# Patient Record
Sex: FEMALE | Race: WHITE | NOT HISPANIC OR LATINO
[De-identification: names, ages, dates, MRNs, and addresses within clinical notes are randomized per-mention and may not be internally consistent; named-entity substitution may affect disease eponyms.]

---

## 2020-12-05 ENCOUNTER — TRANSCRIPTION ENCOUNTER (OUTPATIENT)
Age: 55
End: 2020-12-05

## 2021-07-30 ENCOUNTER — TRANSCRIPTION ENCOUNTER (OUTPATIENT)
Age: 56
End: 2021-07-30

## 2021-08-22 ENCOUNTER — EMERGENCY (EMERGENCY)
Facility: HOSPITAL | Age: 56
LOS: 0 days | Discharge: HOME | End: 2021-08-23
Attending: EMERGENCY MEDICINE | Admitting: EMERGENCY MEDICINE
Payer: COMMERCIAL

## 2021-08-22 VITALS
RESPIRATION RATE: 18 BRPM | TEMPERATURE: 97 F | SYSTOLIC BLOOD PRESSURE: 203 MMHG | HEART RATE: 63 BPM | DIASTOLIC BLOOD PRESSURE: 95 MMHG | WEIGHT: 179.9 LBS | OXYGEN SATURATION: 96 %

## 2021-08-22 DIAGNOSIS — K80.20 CALCULUS OF GALLBLADDER WITHOUT CHOLECYSTITIS WITHOUT OBSTRUCTION: ICD-10-CM

## 2021-08-22 DIAGNOSIS — F17.200 NICOTINE DEPENDENCE, UNSPECIFIED, UNCOMPLICATED: ICD-10-CM

## 2021-08-22 DIAGNOSIS — Z98.84 BARIATRIC SURGERY STATUS: ICD-10-CM

## 2021-08-22 DIAGNOSIS — N39.0 URINARY TRACT INFECTION, SITE NOT SPECIFIED: ICD-10-CM

## 2021-08-22 DIAGNOSIS — Z79.899 OTHER LONG TERM (CURRENT) DRUG THERAPY: ICD-10-CM

## 2021-08-22 DIAGNOSIS — R10.31 RIGHT LOWER QUADRANT PAIN: ICD-10-CM

## 2021-08-22 LAB
ALBUMIN SERPL ELPH-MCNC: 4.2 G/DL — SIGNIFICANT CHANGE UP (ref 3.5–5.2)
ALP SERPL-CCNC: 76 U/L — SIGNIFICANT CHANGE UP (ref 30–115)
ALT FLD-CCNC: 19 U/L — SIGNIFICANT CHANGE UP (ref 0–41)
ANION GAP SERPL CALC-SCNC: 12 MMOL/L — SIGNIFICANT CHANGE UP (ref 7–14)
AST SERPL-CCNC: 24 U/L — SIGNIFICANT CHANGE UP (ref 0–41)
BASOPHILS # BLD AUTO: 0.11 K/UL — SIGNIFICANT CHANGE UP (ref 0–0.2)
BASOPHILS NFR BLD AUTO: 1 % — SIGNIFICANT CHANGE UP (ref 0–1)
BILIRUB SERPL-MCNC: <0.2 MG/DL — SIGNIFICANT CHANGE UP (ref 0.2–1.2)
BUN SERPL-MCNC: 22 MG/DL — HIGH (ref 10–20)
CALCIUM SERPL-MCNC: 9.5 MG/DL — SIGNIFICANT CHANGE UP (ref 8.5–10.1)
CHLORIDE SERPL-SCNC: 111 MMOL/L — HIGH (ref 98–110)
CO2 SERPL-SCNC: 21 MMOL/L — SIGNIFICANT CHANGE UP (ref 17–32)
CREAT SERPL-MCNC: 0.8 MG/DL — SIGNIFICANT CHANGE UP (ref 0.7–1.5)
EOSINOPHIL # BLD AUTO: 0.71 K/UL — HIGH (ref 0–0.7)
EOSINOPHIL NFR BLD AUTO: 6.3 % — SIGNIFICANT CHANGE UP (ref 0–8)
GLUCOSE SERPL-MCNC: 111 MG/DL — HIGH (ref 70–99)
HCT VFR BLD CALC: 45.1 % — SIGNIFICANT CHANGE UP (ref 37–47)
HGB BLD-MCNC: 15 G/DL — SIGNIFICANT CHANGE UP (ref 12–16)
IMM GRANULOCYTES NFR BLD AUTO: 0.6 % — HIGH (ref 0.1–0.3)
LIDOCAIN IGE QN: 52 U/L — SIGNIFICANT CHANGE UP (ref 7–60)
LYMPHOCYTES # BLD AUTO: 3.62 K/UL — HIGH (ref 1.2–3.4)
LYMPHOCYTES # BLD AUTO: 32 % — SIGNIFICANT CHANGE UP (ref 20.5–51.1)
MAGNESIUM SERPL-MCNC: 2 MG/DL — SIGNIFICANT CHANGE UP (ref 1.8–2.4)
MCHC RBC-ENTMCNC: 29.9 PG — SIGNIFICANT CHANGE UP (ref 27–31)
MCHC RBC-ENTMCNC: 33.3 G/DL — SIGNIFICANT CHANGE UP (ref 32–37)
MCV RBC AUTO: 89.8 FL — SIGNIFICANT CHANGE UP (ref 81–99)
MONOCYTES # BLD AUTO: 0.74 K/UL — HIGH (ref 0.1–0.6)
MONOCYTES NFR BLD AUTO: 6.5 % — SIGNIFICANT CHANGE UP (ref 1.7–9.3)
NEUTROPHILS # BLD AUTO: 6.07 K/UL — SIGNIFICANT CHANGE UP (ref 1.4–6.5)
NEUTROPHILS NFR BLD AUTO: 53.6 % — SIGNIFICANT CHANGE UP (ref 42.2–75.2)
NRBC # BLD: 0 /100 WBCS — SIGNIFICANT CHANGE UP (ref 0–0)
PLATELET # BLD AUTO: 379 K/UL — SIGNIFICANT CHANGE UP (ref 130–400)
POTASSIUM SERPL-MCNC: 4.3 MMOL/L — SIGNIFICANT CHANGE UP (ref 3.5–5)
POTASSIUM SERPL-SCNC: 4.3 MMOL/L — SIGNIFICANT CHANGE UP (ref 3.5–5)
PROT SERPL-MCNC: 6.8 G/DL — SIGNIFICANT CHANGE UP (ref 6–8)
RBC # BLD: 5.02 M/UL — SIGNIFICANT CHANGE UP (ref 4.2–5.4)
RBC # FLD: 13.9 % — SIGNIFICANT CHANGE UP (ref 11.5–14.5)
SODIUM SERPL-SCNC: 144 MMOL/L — SIGNIFICANT CHANGE UP (ref 135–146)
WBC # BLD: 11.32 K/UL — HIGH (ref 4.8–10.8)
WBC # FLD AUTO: 11.32 K/UL — HIGH (ref 4.8–10.8)

## 2021-08-22 PROCEDURE — 74177 CT ABD & PELVIS W/CONTRAST: CPT | Mod: 26,MA

## 2021-08-22 PROCEDURE — 99285 EMERGENCY DEPT VISIT HI MDM: CPT

## 2021-08-22 RX ORDER — MORPHINE SULFATE 50 MG/1
2 CAPSULE, EXTENDED RELEASE ORAL ONCE
Refills: 0 | Status: DISCONTINUED | OUTPATIENT
Start: 2021-08-22 | End: 2021-08-22

## 2021-08-22 RX ORDER — IOHEXOL 300 MG/ML
30 INJECTION, SOLUTION INTRAVENOUS ONCE
Refills: 0 | Status: COMPLETED | OUTPATIENT
Start: 2021-08-22 | End: 2021-08-22

## 2021-08-22 RX ORDER — SODIUM CHLORIDE 9 MG/ML
1000 INJECTION INTRAMUSCULAR; INTRAVENOUS; SUBCUTANEOUS ONCE
Refills: 0 | Status: COMPLETED | OUTPATIENT
Start: 2021-08-22 | End: 2021-08-22

## 2021-08-22 RX ORDER — MORPHINE SULFATE 50 MG/1
4 CAPSULE, EXTENDED RELEASE ORAL ONCE
Refills: 0 | Status: DISCONTINUED | OUTPATIENT
Start: 2021-08-22 | End: 2021-08-22

## 2021-08-22 RX ADMIN — IOHEXOL 30 MILLILITER(S): 300 INJECTION, SOLUTION INTRAVENOUS at 20:51

## 2021-08-22 RX ADMIN — MORPHINE SULFATE 4 MILLIGRAM(S): 50 CAPSULE, EXTENDED RELEASE ORAL at 20:51

## 2021-08-22 RX ADMIN — SODIUM CHLORIDE 1000 MILLILITER(S): 9 INJECTION INTRAMUSCULAR; INTRAVENOUS; SUBCUTANEOUS at 20:51

## 2021-08-22 RX ADMIN — MORPHINE SULFATE 2 MILLIGRAM(S): 50 CAPSULE, EXTENDED RELEASE ORAL at 23:41

## 2021-08-22 NOTE — ED ADULT NURSE NOTE - NSIMPLEMENTINTERV_GEN_ALL_ED
Implemented All Universal Safety Interventions:  [place] to call system. Call bell, personal items and telephone within reach. Instruct patient to call for assistance. Room bathroom lighting operational. Non-slip footwear when patient is off stretcher. Physically safe environment: no spills, clutter or unnecessary equipment. Stretcher in lowest position, wheels locked, appropriate side rails in place.

## 2021-08-22 NOTE — ED PROVIDER NOTE - ATTENDING CONTRIBUTION TO CARE
Patient is c/o abd pain, RLQ area x one week. Denies f/c/cp/sob.   Vitals reviewed.   Lungs: CTA,   Abd: +BS, +RLQ tenderness, ND, soft,   A/P: Abd pain,   labs, imaging, reevaluation.

## 2021-08-22 NOTE — ED PROVIDER NOTE - PATIENT PORTAL LINK FT
You can access the FollowMyHealth Patient Portal offered by [facility] by registering at the following website: [email or web address]. By joining [facility]’s FollowMyHealth portal, you will also be able to view your health information using other applications (apps) compatible with our system.

## 2021-08-22 NOTE — ED PROVIDER NOTE - OBJECTIVE STATEMENT
56y F pmh Gastric sleeve 2017 presents for eval of abd pain. Pt has intermittent sharp RLQ pain x1wk, no aggravating or relieving factors. Denies fever, ha, cp, sob, weakness, numbness, dysuria, hematuria, n/v/d/c

## 2021-08-22 NOTE — ED PROVIDER NOTE - NSFOLLOWUPINSTRUCTIONS_ED_ALL_ED_FT
Follow up with PMD in 1-2 days.    Urinary Tract Infection    A urinary tract infection (UTI) is an infection of any part of the urinary tract, which includes the kidneys, ureters, bladder, and urethra. Risk factors include ignoring your need to urinate, wiping back to front if female, being an uncircumcised male, and having diabetes or a weak immune system. Symptoms include frequent urination, pain or burning with urination, foul smelling urine, cloudy urine, pain in the lower abdomen, blood in the urine, and fever. If you were prescribed an antibiotic medicine, take it as told by your health care provider. Do not stop taking the antibiotic even if you start to feel better.    SEEK IMMEDIATE MEDICAL CARE IF YOU HAVE ANY OF THE FOLLOWING SYMPTOMS: severe back or abdominal pain, fever, inability to keep fluids or medicine down, dizziness/lightheadedness, or a change in mental status.

## 2021-08-22 NOTE — ED PROVIDER NOTE - NSFOLLOWUPCLINICS_GEN_ALL_ED_FT
[facility]  Surgery  [address] [place] [address]  Phone: [phone]  Fax:

## 2021-08-22 NOTE — ED PROVIDER NOTE - PHYSICAL EXAMINATION
CONST: NAD  EYES: Sclera and conjunctiva clear.   ENT: No nasal discharge. Oropharynx normal appearing, no erythema or exudates. No abscess or swelling. Uvula midline.   NECK: Non-tender, no meningeal signs. normal ROM. supple   CARD: S1 S2; No jvd  RESP: Equal BS B/L, No wheezes, rhonchi or rales. No distress  GI: RLQ tenderness. Soft, non-distended. no cva tenderness. normal BS  MS: Normal ROM in all extremities. pulses 2 +. no calf tenderness or swelling  SKIN: Warm, dry, no acute rashes. Good turgor  NEURO: A&Ox3, No focal deficits. Strength 5/5 with no sensory deficits. Steady gait.

## 2021-08-23 VITALS — DIASTOLIC BLOOD PRESSURE: 72 MMHG | HEART RATE: 68 BPM | SYSTOLIC BLOOD PRESSURE: 140 MMHG

## 2021-08-23 LAB
APPEARANCE UR: CLEAR — SIGNIFICANT CHANGE UP
BACTERIA # UR AUTO: NEGATIVE — SIGNIFICANT CHANGE UP
BILIRUB UR-MCNC: NEGATIVE — SIGNIFICANT CHANGE UP
COLOR SPEC: SIGNIFICANT CHANGE UP
DIFF PNL FLD: ABNORMAL
EPI CELLS # UR: 2 /HPF — SIGNIFICANT CHANGE UP (ref 0–5)
GLUCOSE UR QL: NEGATIVE — SIGNIFICANT CHANGE UP
HYALINE CASTS # UR AUTO: 2 /LPF — SIGNIFICANT CHANGE UP (ref 0–7)
KETONES UR-MCNC: NEGATIVE — SIGNIFICANT CHANGE UP
LEUKOCYTE ESTERASE UR-ACNC: ABNORMAL
NITRITE UR-MCNC: NEGATIVE — SIGNIFICANT CHANGE UP
PH UR: 5.5 — SIGNIFICANT CHANGE UP (ref 5–8)
PROT UR-MCNC: NEGATIVE — SIGNIFICANT CHANGE UP
RBC CASTS # UR COMP ASSIST: 2 /HPF — SIGNIFICANT CHANGE UP (ref 0–4)
SP GR SPEC: 1.02 — SIGNIFICANT CHANGE UP (ref 1.01–1.03)
UROBILINOGEN FLD QL: SIGNIFICANT CHANGE UP
WBC UR QL: 23 /HPF — HIGH (ref 0–5)

## 2021-08-23 RX ORDER — CEFTRIAXONE 500 MG/1
1000 INJECTION, POWDER, FOR SOLUTION INTRAMUSCULAR; INTRAVENOUS ONCE
Refills: 0 | Status: COMPLETED | OUTPATIENT
Start: 2021-08-23 | End: 2021-08-23

## 2021-08-23 RX ORDER — CEFPODOXIME PROXETIL 100 MG
1 TABLET ORAL
Qty: 20 | Refills: 0
Start: 2021-08-23 | End: 2021-09-01

## 2021-08-23 RX ADMIN — CEFTRIAXONE 100 MILLIGRAM(S): 500 INJECTION, POWDER, FOR SOLUTION INTRAMUSCULAR; INTRAVENOUS at 01:20

## 2021-08-24 ENCOUNTER — EMERGENCY (EMERGENCY)
Facility: HOSPITAL | Age: 56
LOS: 0 days | Discharge: HOME | End: 2021-08-24
Attending: EMERGENCY MEDICINE | Admitting: EMERGENCY MEDICINE
Payer: COMMERCIAL

## 2021-08-24 VITALS
SYSTOLIC BLOOD PRESSURE: 224 MMHG | WEIGHT: 179.9 LBS | TEMPERATURE: 98 F | DIASTOLIC BLOOD PRESSURE: 101 MMHG | HEART RATE: 65 BPM | OXYGEN SATURATION: 98 % | RESPIRATION RATE: 20 BRPM

## 2021-08-24 VITALS
DIASTOLIC BLOOD PRESSURE: 91 MMHG | OXYGEN SATURATION: 98 % | RESPIRATION RATE: 18 BRPM | SYSTOLIC BLOOD PRESSURE: 204 MMHG | TEMPERATURE: 97 F | HEART RATE: 68 BPM

## 2021-08-24 DIAGNOSIS — M25.551 PAIN IN RIGHT HIP: ICD-10-CM

## 2021-08-24 LAB
ALBUMIN SERPL ELPH-MCNC: 4.4 G/DL — SIGNIFICANT CHANGE UP (ref 3.5–5.2)
ALP SERPL-CCNC: 66 U/L — SIGNIFICANT CHANGE UP (ref 30–115)
ALT FLD-CCNC: 19 U/L — SIGNIFICANT CHANGE UP (ref 0–41)
ANION GAP SERPL CALC-SCNC: 15 MMOL/L — HIGH (ref 7–14)
APPEARANCE UR: CLEAR — SIGNIFICANT CHANGE UP
AST SERPL-CCNC: 25 U/L — SIGNIFICANT CHANGE UP (ref 0–41)
BACTERIA # UR AUTO: NEGATIVE — SIGNIFICANT CHANGE UP
BASOPHILS # BLD AUTO: 0.09 K/UL — SIGNIFICANT CHANGE UP (ref 0–0.2)
BASOPHILS NFR BLD AUTO: 0.9 % — SIGNIFICANT CHANGE UP (ref 0–1)
BILIRUB SERPL-MCNC: 0.4 MG/DL — SIGNIFICANT CHANGE UP (ref 0.2–1.2)
BILIRUB UR-MCNC: NEGATIVE — SIGNIFICANT CHANGE UP
BUN SERPL-MCNC: 15 MG/DL — SIGNIFICANT CHANGE UP (ref 10–20)
CALCIUM SERPL-MCNC: 9.5 MG/DL — SIGNIFICANT CHANGE UP (ref 8.5–10.1)
CHLORIDE SERPL-SCNC: 105 MMOL/L — SIGNIFICANT CHANGE UP (ref 98–110)
CO2 SERPL-SCNC: 20 MMOL/L — SIGNIFICANT CHANGE UP (ref 17–32)
COLOR SPEC: YELLOW — SIGNIFICANT CHANGE UP
CREAT SERPL-MCNC: 0.7 MG/DL — SIGNIFICANT CHANGE UP (ref 0.7–1.5)
CULTURE RESULTS: SIGNIFICANT CHANGE UP
DIFF PNL FLD: ABNORMAL
EOSINOPHIL # BLD AUTO: 0.16 K/UL — SIGNIFICANT CHANGE UP (ref 0–0.7)
EOSINOPHIL NFR BLD AUTO: 1.6 % — SIGNIFICANT CHANGE UP (ref 0–8)
EPI CELLS # UR: 4 /HPF — SIGNIFICANT CHANGE UP (ref 0–5)
GLUCOSE SERPL-MCNC: 106 MG/DL — HIGH (ref 70–99)
GLUCOSE UR QL: NEGATIVE — SIGNIFICANT CHANGE UP
HCT VFR BLD CALC: 45.2 % — SIGNIFICANT CHANGE UP (ref 37–47)
HGB BLD-MCNC: 14.9 G/DL — SIGNIFICANT CHANGE UP (ref 12–16)
HYALINE CASTS # UR AUTO: 4 /LPF — SIGNIFICANT CHANGE UP (ref 0–7)
IMM GRANULOCYTES NFR BLD AUTO: 0.5 % — HIGH (ref 0.1–0.3)
KETONES UR-MCNC: NEGATIVE — SIGNIFICANT CHANGE UP
LEUKOCYTE ESTERASE UR-ACNC: ABNORMAL
LYMPHOCYTES # BLD AUTO: 2.17 K/UL — SIGNIFICANT CHANGE UP (ref 1.2–3.4)
LYMPHOCYTES # BLD AUTO: 22.3 % — SIGNIFICANT CHANGE UP (ref 20.5–51.1)
MCHC RBC-ENTMCNC: 29.4 PG — SIGNIFICANT CHANGE UP (ref 27–31)
MCHC RBC-ENTMCNC: 33 G/DL — SIGNIFICANT CHANGE UP (ref 32–37)
MCV RBC AUTO: 89.2 FL — SIGNIFICANT CHANGE UP (ref 81–99)
MONOCYTES # BLD AUTO: 0.65 K/UL — HIGH (ref 0.1–0.6)
MONOCYTES NFR BLD AUTO: 6.7 % — SIGNIFICANT CHANGE UP (ref 1.7–9.3)
NEUTROPHILS # BLD AUTO: 6.61 K/UL — HIGH (ref 1.4–6.5)
NEUTROPHILS NFR BLD AUTO: 68 % — SIGNIFICANT CHANGE UP (ref 42.2–75.2)
NITRITE UR-MCNC: NEGATIVE — SIGNIFICANT CHANGE UP
NRBC # BLD: 0 /100 WBCS — SIGNIFICANT CHANGE UP (ref 0–0)
PH UR: 6 — SIGNIFICANT CHANGE UP (ref 5–8)
PLATELET # BLD AUTO: 369 K/UL — SIGNIFICANT CHANGE UP (ref 130–400)
POTASSIUM SERPL-MCNC: 4.2 MMOL/L — SIGNIFICANT CHANGE UP (ref 3.5–5)
POTASSIUM SERPL-SCNC: 4.2 MMOL/L — SIGNIFICANT CHANGE UP (ref 3.5–5)
PROT SERPL-MCNC: 6.9 G/DL — SIGNIFICANT CHANGE UP (ref 6–8)
PROT UR-MCNC: SIGNIFICANT CHANGE UP
RBC # BLD: 5.07 M/UL — SIGNIFICANT CHANGE UP (ref 4.2–5.4)
RBC # FLD: 13.5 % — SIGNIFICANT CHANGE UP (ref 11.5–14.5)
RBC CASTS # UR COMP ASSIST: 5 /HPF — HIGH (ref 0–4)
SODIUM SERPL-SCNC: 140 MMOL/L — SIGNIFICANT CHANGE UP (ref 135–146)
SP GR SPEC: 1.02 — SIGNIFICANT CHANGE UP (ref 1.01–1.03)
SPECIMEN SOURCE: SIGNIFICANT CHANGE UP
UROBILINOGEN FLD QL: SIGNIFICANT CHANGE UP
WBC # BLD: 9.73 K/UL — SIGNIFICANT CHANGE UP (ref 4.8–10.8)
WBC # FLD AUTO: 9.73 K/UL — SIGNIFICANT CHANGE UP (ref 4.8–10.8)
WBC UR QL: 15 /HPF — HIGH (ref 0–5)

## 2021-08-24 PROCEDURE — 72170 X-RAY EXAM OF PELVIS: CPT | Mod: 26

## 2021-08-24 PROCEDURE — 93970 EXTREMITY STUDY: CPT | Mod: 26

## 2021-08-24 PROCEDURE — 99285 EMERGENCY DEPT VISIT HI MDM: CPT

## 2021-08-24 RX ORDER — ACETAMINOPHEN 500 MG
650 TABLET ORAL ONCE
Refills: 0 | Status: COMPLETED | OUTPATIENT
Start: 2021-08-24 | End: 2021-08-24

## 2021-08-24 RX ORDER — METHOCARBAMOL 500 MG/1
1500 TABLET, FILM COATED ORAL ONCE
Refills: 0 | Status: COMPLETED | OUTPATIENT
Start: 2021-08-24 | End: 2021-08-24

## 2021-08-24 RX ORDER — METHOCARBAMOL 500 MG/1
2 TABLET, FILM COATED ORAL
Qty: 18 | Refills: 0
Start: 2021-08-24 | End: 2021-08-26

## 2021-08-24 RX ORDER — ACETAMINOPHEN 500 MG
325 TABLET ORAL ONCE
Refills: 0 | Status: COMPLETED | OUTPATIENT
Start: 2021-08-24 | End: 2021-08-24

## 2021-08-24 RX ORDER — KETOROLAC TROMETHAMINE 30 MG/ML
15 SYRINGE (ML) INJECTION ONCE
Refills: 0 | Status: DISCONTINUED | OUTPATIENT
Start: 2021-08-24 | End: 2021-08-24

## 2021-08-24 RX ADMIN — METHOCARBAMOL 1500 MILLIGRAM(S): 500 TABLET, FILM COATED ORAL at 12:31

## 2021-08-24 RX ADMIN — Medication 650 MILLIGRAM(S): at 12:28

## 2021-08-24 RX ADMIN — Medication 15 MILLIGRAM(S): at 12:27

## 2021-08-24 RX ADMIN — Medication 325 MILLIGRAM(S): at 15:09

## 2021-08-24 NOTE — ED PROVIDER NOTE - ATTENDING CONTRIBUTION TO CARE
57 yo female pMH gastric sleeve in 2017 c/o rt inguinal /hip pain for several days.  Pain is worse with external rotation of the hip and pivoting on the joint as well as sitting.  It is non-radiating, and not associated with any fever, chills, N/V/D/black or bloody stools, no urinary complaints, no back pain, weight loss, focal weakness or paresthesias.  patient has not been taking anything for pain.  She was seen  in ED 2 days ago for the same, hd labs and CT scan of abdomen/pelvis done which was negative for acute pathology.   Well-appearing well-nourished middle aged female in  NAD, head AT/NC, PERRL, pink conjunctivae,  mmm, nml oropharynx, nml phonation without drooling or trismus, supple neck without midline spine ttp, nml work of breathing, lungs CTA b/l, equal air entry, RRR, well-perfused extremities, distal pulses intact, abdomen soft, NT/ND, BS present in all quadrants, no midline spine or CVA ttp, no leg edema or unilateral calf swelling, + ttp in the rt inguinal region without palpable masses or skin changes, ambulating with a steady gait,  A&Ox3, no focal neuro deficits, nml mood and affect. Analgesia given, pain is likely MSK in etiology, she was advised to take OTC NSAIDs and to follow up with ortho this week, strict return precautions given.

## 2021-08-24 NOTE — ED PROVIDER NOTE - NSFOLLOWUPINSTRUCTIONS_ED_ALL_ED_FT
Musculoskeletal Pain    Musculoskeletal pain is muscle and winston aches and pains. These pains can occur in any part of the body. Your caregiver may treat you without knowing the cause of the pain. They may treat you if blood or urine tests, X-rays, and other tests were normal.     CAUSES  There is often not a definite cause or reason for these pains. These pains may be caused by a type of germ (virus). The discomfort may also come from overuse. Overuse includes working out too hard when your body is not fit. [place] aches also come from weather changes. Bone is sensitive to atmospheric pressure changes.    HOME CARE INSTRUCTIONS  Ask when your test results will be ready. Make sure you get your test results.  Only take over-the-counter or prescription medicines for pain, discomfort, or fever as directed by your caregiver. If you were given medications for your condition, do not drive, operate machinery or power tools, or sign legal documents for 24 hours. Do not drink alcohol. Do not take sleeping pills or other medications that may interfere with treatment.  Continue all activities unless the activities cause more pain. When the pain lessens, slowly resume normal activities. Gradually increase the intensity and duration of the activities or exercise.   During periods of severe pain, bed rest may be helpful. Lay or sit in any position that is comfortable.   Putting ice on the injured area.  Put ice in a bag.   Place a towel between your skin and the bag.  Leave the ice on for 15 to 20 minutes, 3 to 4 times a day.   Follow up with your caregiver for continued problems and no reason can be found for the pain. If the pain becomes worse or does not go away, it may be necessary to repeat tests or do additional testing. Your caregiver may need to look further for a possible cause.    SEEK IMMEDIATE MEDICAL CARE IF:  You have pain that is getting worse and is not relieved by medications.  You develop chest pain that is associated with shortness or breath, sweating, feeling sick to your stomach (nauseous), or throw up (vomit).  Your pain becomes localized to the abdomen.  You develop any new symptoms that seem different or that concern you.    MAKE SURE YOU:  Understand these instructions.   Will watch your condition.  Will get help right away if you are not doing well or get worse.    ADDITIONAL NOTES AND INSTRUCTIONS    Please follow up with your Primary MD in 24-48 hr.  Seek immediate medical care for any new/worsening signs or symptoms.

## 2021-08-24 NOTE — ED PROVIDER NOTE - OBJECTIVE STATEMENT
55 yo female w/ PMH of gastric sleeve 2017 presents for R hip pain x 2 weeks.  No inciting event or trauma, worse in certain positions and laying down on R side, pressure that on palpation and certain positions becomes sharp pain, at R inguinal ligament with radiation to R iliac crest, denies having had in the past.  Was evaluated in ED 2 days ago and received CT scan to r/o abdominal pathology and results were negative.  UA showed positive leukocyte esterase w/ small amount of WBCs so was put on cefpodoxime but culture was negative.  Denies fevers, N/V, diarrhea, hx of blood clots, leg swelling, urinary sxs, vaginal bleeding, vaginal discharge, hx of STDs.  Sexually active with  only and does not want STD testing.

## 2021-08-24 NOTE — ED ADULT TRIAGE NOTE - CHIEF COMPLAINT QUOTE
patient reports right groin pain. - patient diagnose with UTI taking abx as prescribed. patient reports increase in pain . denies fever. able to void fully

## 2021-08-24 NOTE — ED PROVIDER NOTE - PROGRESS NOTE DETAILS
mago: prelim DVT study US read by vascular tech michaela negative for DVT Vern: Pt stating that she wants to leave.  Discussed that US findings are prelim findings and that final read may change, in the event that it does pt is to return immediately to ER. Pt agrees to plan, will DC.

## 2021-08-24 NOTE — ED PROVIDER NOTE - CARE PROVIDER_API CALL
Juan Pineda (MD)  Orthopaedic Surgery  [address] [place] [address]  Phone: [phone]  Fax: [phone]  Follow Up Time: 1-3 Days

## 2021-08-24 NOTE — ED PROVIDER NOTE - NS ED ROS FT
Constitutional: No fevers, chills, or malaise.  HEENT: No headache, visual changes  Cardiac:  No chest pain, SOB  Respiratory:  No cough, respiratory distress, or hemoptysis.  GI:  No nausea, vomiting, diarrhea  :  No dysuria, frequency, or urgency.  MS:  No muscle weakness or back pain.  Neuro:  No paralysis or N/T.  Skin:  No skin rash.   Endocrine: No polyuria, polyphagia, or polydipsia.

## 2021-08-24 NOTE — ED PROVIDER NOTE - PATIENT PORTAL LINK FT
You can access the FollowMyHealth Patient Portal offered by [facility] by registering at the following website: [email or web address]. By joining [facility]s FollowMyHealth portal, you will also be able to view your health information using other applications (apps) compatible with our system.

## 2021-08-24 NOTE — ED PROVIDER NOTE - PHYSICAL EXAMINATION
GENERAL: NAD   SKIN: warm, dry  HEAD: Normocephalic; atraumatic.  CARD: S1, S2 normal; no murmurs, gallops, or rubs. Regular rate and rhythm.   RESP: LCTAB; No wheezes, rales, rhonchi, or stridor.  ABD: soft, nontender, and nondistended  BACK: No CVA tenderness   EXT: TTP over R inguinal ligament.  R hip ROM WNL but pain with pain elicited on ROM testing.   NEURO: Alert, oriented, grossly unremarkable  PSYCH: Cooperative, appropriate.

## 2021-08-24 NOTE — ED PROVIDER NOTE - CLINICAL SUMMARY MEDICAL DECISION MAKING FREE TEXT BOX
55 yo female pMH gastric sleeve in 2017 c/o rt inguinal /hip pain for several days.  Pain is worse with external rotation of the hip and pivoting on the joint as well as sitting.  It is non-radiating, and not associated with any fever, chills, N/V/D/black or bloody stools, no urinary complaints, no back pain, weight loss, focal weakness or paresthesias.  patient has not been taking anything for pain.  She was seen  in ED 2 days ago for the same, hd labs and CT scan of abdomen/pelvis done which was negative for acute pathology.   Well-appearing well-nourished middle aged female in  NAD, head AT/NC, PERRL, pink conjunctivae,  mmm, nml oropharynx, nml phonation without drooling or trismus, supple neck without midline spine ttp, nml work of breathing, lungs CTA b/l, equal air entry, RRR, well-perfused extremities, distal pulses intact, abdomen soft, NT/ND, BS present in all quadrants, no midline spine or CVA ttp, no leg edema or unilateral calf swelling, + ttp in the rt inguinal region without palpable masses or skin changes, ambulating with a steady gait,  A&Ox3, no focal neuro deficits, nml mood and affect. Analgesia given, pain is likely MSK in etiology, she was advised to take OTC NSAIDs and to follow up with ortho this week, strict return precautions given.

## 2021-08-24 NOTE — ED PROVIDER NOTE - CHIEF COMPLAINT
The patient is a 56y Female complaining of abdominal pain. The patient is a 56y Female complaining of hip pain

## 2021-08-25 LAB
CULTURE RESULTS: SIGNIFICANT CHANGE UP
SPECIMEN SOURCE: SIGNIFICANT CHANGE UP

## 2021-08-26 ENCOUNTER — APPOINTMENT (OUTPATIENT)
Age: 56
End: 2021-08-26

## 2021-08-26 ENCOUNTER — INPATIENT (INPATIENT)
Facility: HOSPITAL | Age: 56
LOS: 3 days | Discharge: HOME | End: 2021-08-30
Attending: INTERNAL MEDICINE | Admitting: INTERNAL MEDICINE
Payer: COMMERCIAL

## 2021-08-26 VITALS
SYSTOLIC BLOOD PRESSURE: 166 MMHG | OXYGEN SATURATION: 99 % | HEART RATE: 78 BPM | DIASTOLIC BLOOD PRESSURE: 72 MMHG | WEIGHT: 184.97 LBS | TEMPERATURE: 99 F | RESPIRATION RATE: 18 BRPM

## 2021-08-26 DIAGNOSIS — Z98.890 OTHER SPECIFIED POSTPROCEDURAL STATES: Chronic | ICD-10-CM

## 2021-08-26 PROBLEM — Z00.00 ENCOUNTER FOR PREVENTIVE HEALTH EXAMINATION: Status: ACTIVE | Noted: 2021-08-26

## 2021-08-26 LAB
ALBUMIN SERPL ELPH-MCNC: 4.3 G/DL — SIGNIFICANT CHANGE UP (ref 3.5–5.2)
ALP SERPL-CCNC: 63 U/L — SIGNIFICANT CHANGE UP (ref 30–115)
ALT FLD-CCNC: 22 U/L — SIGNIFICANT CHANGE UP (ref 0–41)
ANION GAP SERPL CALC-SCNC: 13 MMOL/L — SIGNIFICANT CHANGE UP (ref 7–14)
APPEARANCE UR: CLEAR — SIGNIFICANT CHANGE UP
AST SERPL-CCNC: 18 U/L — SIGNIFICANT CHANGE UP (ref 0–41)
BASOPHILS # BLD AUTO: 0.09 K/UL — SIGNIFICANT CHANGE UP (ref 0–0.2)
BASOPHILS NFR BLD AUTO: 0.9 % — SIGNIFICANT CHANGE UP (ref 0–1)
BILIRUB DIRECT SERPL-MCNC: <0.2 MG/DL — SIGNIFICANT CHANGE UP (ref 0–0.2)
BILIRUB INDIRECT FLD-MCNC: >0.3 MG/DL — SIGNIFICANT CHANGE UP (ref 0.2–1.2)
BILIRUB SERPL-MCNC: 0.5 MG/DL — SIGNIFICANT CHANGE UP (ref 0.2–1.2)
BILIRUB UR-MCNC: NEGATIVE — SIGNIFICANT CHANGE UP
BUN SERPL-MCNC: 16 MG/DL — SIGNIFICANT CHANGE UP (ref 10–20)
CALCIUM SERPL-MCNC: 9.7 MG/DL — SIGNIFICANT CHANGE UP (ref 8.5–10.1)
CHLORIDE SERPL-SCNC: 107 MMOL/L — SIGNIFICANT CHANGE UP (ref 98–110)
CO2 SERPL-SCNC: 23 MMOL/L — SIGNIFICANT CHANGE UP (ref 17–32)
COLOR SPEC: YELLOW — SIGNIFICANT CHANGE UP
COMMENT - URINE: SIGNIFICANT CHANGE UP
CREAT SERPL-MCNC: 0.7 MG/DL — SIGNIFICANT CHANGE UP (ref 0.7–1.5)
DIFF PNL FLD: NEGATIVE — SIGNIFICANT CHANGE UP
EOSINOPHIL # BLD AUTO: 0.05 K/UL — SIGNIFICANT CHANGE UP (ref 0–0.7)
EOSINOPHIL NFR BLD AUTO: 0.5 % — SIGNIFICANT CHANGE UP (ref 0–8)
EPI CELLS # UR: ABNORMAL /HPF
GLUCOSE SERPL-MCNC: 104 MG/DL — HIGH (ref 70–99)
GLUCOSE UR QL: NEGATIVE MG/DL — SIGNIFICANT CHANGE UP
HCG SERPL QL: NEGATIVE — SIGNIFICANT CHANGE UP
HCT VFR BLD CALC: 44.5 % — SIGNIFICANT CHANGE UP (ref 37–47)
HGB BLD-MCNC: 14.8 G/DL — SIGNIFICANT CHANGE UP (ref 12–16)
IMM GRANULOCYTES NFR BLD AUTO: 0.4 % — HIGH (ref 0.1–0.3)
KETONES UR-MCNC: NEGATIVE — SIGNIFICANT CHANGE UP
LACTATE SERPL-SCNC: 1.4 MMOL/L — SIGNIFICANT CHANGE UP (ref 0.7–2)
LEUKOCYTE ESTERASE UR-ACNC: ABNORMAL
LIDOCAIN IGE QN: 28 U/L — SIGNIFICANT CHANGE UP (ref 7–60)
LYMPHOCYTES # BLD AUTO: 2.43 K/UL — SIGNIFICANT CHANGE UP (ref 1.2–3.4)
LYMPHOCYTES # BLD AUTO: 23.3 % — SIGNIFICANT CHANGE UP (ref 20.5–51.1)
MCHC RBC-ENTMCNC: 29.6 PG — SIGNIFICANT CHANGE UP (ref 27–31)
MCHC RBC-ENTMCNC: 33.3 G/DL — SIGNIFICANT CHANGE UP (ref 32–37)
MCV RBC AUTO: 89 FL — SIGNIFICANT CHANGE UP (ref 81–99)
MONOCYTES # BLD AUTO: 0.69 K/UL — HIGH (ref 0.1–0.6)
MONOCYTES NFR BLD AUTO: 6.6 % — SIGNIFICANT CHANGE UP (ref 1.7–9.3)
NEUTROPHILS # BLD AUTO: 7.14 K/UL — HIGH (ref 1.4–6.5)
NEUTROPHILS NFR BLD AUTO: 68.3 % — SIGNIFICANT CHANGE UP (ref 42.2–75.2)
NITRITE UR-MCNC: NEGATIVE — SIGNIFICANT CHANGE UP
NRBC # BLD: 0 /100 WBCS — SIGNIFICANT CHANGE UP (ref 0–0)
PH UR: 6 — SIGNIFICANT CHANGE UP (ref 5–8)
PLATELET # BLD AUTO: 360 K/UL — SIGNIFICANT CHANGE UP (ref 130–400)
POTASSIUM SERPL-MCNC: 3.8 MMOL/L — SIGNIFICANT CHANGE UP (ref 3.5–5)
POTASSIUM SERPL-SCNC: 3.8 MMOL/L — SIGNIFICANT CHANGE UP (ref 3.5–5)
PROT SERPL-MCNC: 6.6 G/DL — SIGNIFICANT CHANGE UP (ref 6–8)
PROT UR-MCNC: NEGATIVE MG/DL — SIGNIFICANT CHANGE UP
RBC # BLD: 5 M/UL — SIGNIFICANT CHANGE UP (ref 4.2–5.4)
RBC # FLD: 13.5 % — SIGNIFICANT CHANGE UP (ref 11.5–14.5)
RBC CASTS # UR COMP ASSIST: NEGATIVE — SIGNIFICANT CHANGE UP
SARS-COV-2 RNA SPEC QL NAA+PROBE: SIGNIFICANT CHANGE UP
SODIUM SERPL-SCNC: 143 MMOL/L — SIGNIFICANT CHANGE UP (ref 135–146)
SP GR SPEC: 1.01 — SIGNIFICANT CHANGE UP (ref 1.01–1.03)
UROBILINOGEN FLD QL: 0.2 MG/DL — SIGNIFICANT CHANGE UP (ref 0.2–0.2)
WBC # BLD: 10.44 K/UL — SIGNIFICANT CHANGE UP (ref 4.8–10.8)
WBC # FLD AUTO: 10.44 K/UL — SIGNIFICANT CHANGE UP (ref 4.8–10.8)
WBC UR QL: ABNORMAL /HPF

## 2021-08-26 PROCEDURE — 99223 1ST HOSP IP/OBS HIGH 75: CPT | Mod: 25

## 2021-08-26 PROCEDURE — 74177 CT ABD & PELVIS W/CONTRAST: CPT | Mod: 26,MA

## 2021-08-26 PROCEDURE — 99285 EMERGENCY DEPT VISIT HI MDM: CPT

## 2021-08-26 PROCEDURE — 76856 US EXAM PELVIC COMPLETE: CPT | Mod: 26

## 2021-08-26 PROCEDURE — 99406 BEHAV CHNG SMOKING 3-10 MIN: CPT

## 2021-08-26 RX ORDER — MORPHINE SULFATE 50 MG/1
4 CAPSULE, EXTENDED RELEASE ORAL ONCE
Refills: 0 | Status: DISCONTINUED | OUTPATIENT
Start: 2021-08-26 | End: 2021-08-26

## 2021-08-26 RX ORDER — MORPHINE SULFATE 50 MG/1
4 CAPSULE, EXTENDED RELEASE ORAL EVERY 4 HOURS
Refills: 0 | Status: DISCONTINUED | OUTPATIENT
Start: 2021-08-26 | End: 2021-08-27

## 2021-08-26 RX ORDER — KETOROLAC TROMETHAMINE 30 MG/ML
15 SYRINGE (ML) INJECTION THREE TIMES A DAY
Refills: 0 | Status: DISCONTINUED | OUTPATIENT
Start: 2021-08-26 | End: 2021-08-30

## 2021-08-26 RX ORDER — CEFPODOXIME PROXETIL 100 MG
100 TABLET ORAL EVERY 12 HOURS
Refills: 0 | Status: DISCONTINUED | OUTPATIENT
Start: 2021-08-26 | End: 2021-08-30

## 2021-08-26 RX ORDER — ENOXAPARIN SODIUM 100 MG/ML
40 INJECTION SUBCUTANEOUS AT BEDTIME
Refills: 0 | Status: DISCONTINUED | OUTPATIENT
Start: 2021-08-26 | End: 2021-08-30

## 2021-08-26 RX ORDER — PANTOPRAZOLE SODIUM 20 MG/1
40 TABLET, DELAYED RELEASE ORAL
Refills: 0 | Status: DISCONTINUED | OUTPATIENT
Start: 2021-08-26 | End: 2021-08-30

## 2021-08-26 RX ORDER — IOHEXOL 300 MG/ML
30 INJECTION, SOLUTION INTRAVENOUS ONCE
Refills: 0 | Status: COMPLETED | OUTPATIENT
Start: 2021-08-26 | End: 2021-08-26

## 2021-08-26 RX ORDER — KETOROLAC TROMETHAMINE 30 MG/ML
15 SYRINGE (ML) INJECTION ONCE
Refills: 0 | Status: DISCONTINUED | OUTPATIENT
Start: 2021-08-26 | End: 2021-08-26

## 2021-08-26 RX ORDER — METHOCARBAMOL 500 MG/1
1500 TABLET, FILM COATED ORAL THREE TIMES A DAY
Refills: 0 | Status: DISCONTINUED | OUTPATIENT
Start: 2021-08-26 | End: 2021-08-30

## 2021-08-26 RX ADMIN — IOHEXOL 30 MILLILITER(S): 300 INJECTION, SOLUTION INTRAVENOUS at 14:26

## 2021-08-26 RX ADMIN — MORPHINE SULFATE 4 MILLIGRAM(S): 50 CAPSULE, EXTENDED RELEASE ORAL at 14:26

## 2021-08-26 RX ADMIN — PANTOPRAZOLE SODIUM 40 MILLIGRAM(S): 20 TABLET, DELAYED RELEASE ORAL at 22:45

## 2021-08-26 RX ADMIN — MORPHINE SULFATE 4 MILLIGRAM(S): 50 CAPSULE, EXTENDED RELEASE ORAL at 17:39

## 2021-08-26 RX ADMIN — MORPHINE SULFATE 4 MILLIGRAM(S): 50 CAPSULE, EXTENDED RELEASE ORAL at 23:25

## 2021-08-26 RX ADMIN — Medication 15 MILLIGRAM(S): at 13:20

## 2021-08-26 RX ADMIN — METHOCARBAMOL 1500 MILLIGRAM(S): 500 TABLET, FILM COATED ORAL at 22:45

## 2021-08-26 RX ADMIN — MORPHINE SULFATE 4 MILLIGRAM(S): 50 CAPSULE, EXTENDED RELEASE ORAL at 18:57

## 2021-08-26 RX ADMIN — MORPHINE SULFATE 4 MILLIGRAM(S): 50 CAPSULE, EXTENDED RELEASE ORAL at 14:50

## 2021-08-26 NOTE — ED SUB INTERN NOTE - MEDICAL DECISION MAKING DETAILS
Patient is a 56 year old female with history of gastric sleeve presenting with right lower quadrant pain that is not associated with GI or  symptoms, recently worked up at [facility] for same complaint, concerning for possible ovarian or uterine pathology vs missed kidney stone.   Recent workup included CT abdomen/pelvis which did not show renal calculus, appendicitis, ovarian torsion, hernia, or intraabdominal pathology.   We will obtain transvaginal ultrasound to examine ovarian/tubal/uterine anatomy for signs of abnormality. If negative, we will obtain CT abdomen/pelvis with contrast for possible missed intraabdominal pathology, including possible stone at uterovesicular junction.  Labs to include CBC, CMP, pregnancy test.  Toradol for pain management.  ...  Patient complaining of persistent pain. We will administer Morphine 4mg x1 for pain. Patient is a 56 year old female with history of gastric sleeve presenting with right lower quadrant pain that is not associated with GI or  symptoms, recently worked up at [facility] for same complaint, concerning for possible ovarian or uterine pathology vs missed kidney stone.   Recent workup included CT abdomen/pelvis which did not show renal calculus, appendicitis, ovarian torsion, hernia, or intraabdominal pathology.   We will obtain transvaginal ultrasound to examine ovarian/tubal/uterine anatomy for signs of abnormality. If negative, we will obtain CT abdomen/pelvis with contrast for possible missed intraabdominal pathology, including possible stone at uterovesicular junction.  Labs to include CBC, CMP, liver function, Lactate, STD (GC/Chlamydia), pregnancy test.  Toradol for pain management.  ...  Patient complaining of persistent pain. We will administer Morphine 4mg x1 for pain. Patient is a 56 year old female with history of gastric sleeve presenting with right lower quadrant pain that is not associated with GI or  symptoms, recently worked up at [facility] for same complaint, concerning for possible ovarian or uterine pathology vs missed kidney stone.   Recent workup included CT abdomen/pelvis which did not show renal calculus, appendicitis, ovarian torsion, hernia, or intraabdominal pathology.   We will obtain transvaginal ultrasound to examine ovarian/tubal/uterine anatomy for signs of abnormality. If negative, we will obtain CT abdomen/pelvis with contrast for possible missed intraabdominal pathology, including possible stone at uterovesicular junction.  Labs to include CBC, CMP, liver function, Lactate, STD (GC/Chlamydia), pregnancy test.  Toradol for pain management.  ...  Patient complaining of persistent pain. We will administer Morphine 4mg x1 for pain.  ...  Transvaginal Ultrasound negative for intrapelvic pathology. We will obtain CT abdomen/pelvis with contrast. If negative, we will admit patient for further workup with possible MRI to assess for ligamentous injury or musculoskeletal etiology.

## 2021-08-26 NOTE — ED PROVIDER NOTE - PROGRESS NOTE DETAILS
SR: pelvic exam performed alongside Dr. Ashley : no vaginal discharge , no cmt no adenexal ttp. SR: patient with continued pain, requiring multiple doses of pain medications difficulty ambulating will admit

## 2021-08-26 NOTE — H&P ADULT - ASSESSMENT
56 yr old female with no medical history admitted for abdominal pain.     # Acute RLQ pain   - etiology unclear  - pain severe enough to limit ambulation   - Repeat CT Abdomen and Pelvis w/ Oral Cont and w/ IV Cont (08.26.21): No evidence of acute abdominopelvic pathology. Essentially unchanged imaging since 8/22/2020. Cholelithiasis  - pain control with ketorolac  - start Protonix  - if pain persists may need MRI   - GI consult     # DVT ppx  - start Lovenox     # DASH diet    # Ambulate as tolerated    # Full code 56 yr old female with no medical history admitted for abdominal pain.     # Acute RLQ pain   - etiology unclear  - pain severe enough to limit ambulation   - Repeat CT Abdomen and Pelvis w/ Oral Cont and w/ IV Cont (08.26.21): No evidence of acute abdominopelvic pathology. Essentially unchanged imaging since 8/22/2020. Cholelithiasis  - pain control with ketorolac  - start Protonix  - if pain persists may need MRI   - GI consult     # UTI   - continue home cefpodoxime to complete abx course    # Nicotine Abuse  - counselled to quit smoking  - refused nicotine patch     # DVT ppx  - start Lovenox     # DASH diet    # Ambulate as tolerated    # Full code 56 yr old female with no medical history admitted for abdominal pain.     # Acute Groin pain   - r/o right hip joint disease   - pain severe enough to limit ambulation   - Repeat CT Abdomen and Pelvis w/ Oral Cont and w/ IV Cont (08.26.21): No evidence of acute abdominopelvic pathology. Essentially unchanged imaging since 8/22/2020. Cholelithiasis  - pain control with ketorolac  - start Protonix  - start pred 40mg PO daily for 4 days   - MR pelvis without contrast ordered     # Nicotine Abuse  - counselled to quit smoking  - refused nicotine patch     # DVT ppx  - start Lovenox     # DASH diet    # Ambulate as tolerated    # Full code

## 2021-08-26 NOTE — ED PROVIDER NOTE - PHYSICAL EXAMINATION
Vital Signs: Reviewed  GEN: alert, uncomfortable appearing, speaks full sentences  HEAD:  normocephalic, atraumatic  EYES:  PERRLA; conjunctivae without injection, drainage or discharge  ENMT:  nasal mucosa moist; mouth moist without ulcerations or lesions; throat moist without erythema, exudate, ulcerations or lesions  NECK:  supple  CARDIAC:  regular rate, normal S1 and S2, no murmurs  RESP:  respiratory rate and effort appear normal for age; lungs are clear to auscultation bilaterally; no rales or wheezes  ABDOMEN: RLQ tenderness no guarding no rebound; soft, nondistended  : no flank tenderness, no vaginal discharge, no CMT, no adnexal tenderness     chaperone: Dr Mackey  MUSCULOSKELETAL/NEURO: pain elicited on internal/external rotation of hip, gait uncomfortable; otherwise normal movement, normal tone  SKIN:  normal skin color for age and race, well-perfused; warm and dry

## 2021-08-26 NOTE — ED ADULT NURSE NOTE - NSIMPLEMENTINTERV_GEN_ALL_ED
Implemented All Universal Safety Interventions:  [place] to call system. Call bell, personal items and telephone within reach. Instruct patient to call for assistance. Room bathroom lighting operational. Non-slip footwear when patient is off stretcher. Physically safe environment: no spills, clutter or unnecessary equipment. Stretcher in lowest position, wheels locked, appropriate side rails in place.

## 2021-08-26 NOTE — ED PROVIDER NOTE - NSICDXPASTSURGICALHX_GEN_ALL_CORE_FT
PAST SURGICAL HISTORY:  No significant past surgical history PAST SURGICAL HISTORY:  History of gastric surgery

## 2021-08-26 NOTE — H&P ADULT - NSHPLABSRESULTS_GEN_ALL_CORE
14.8   10.44 )-----------( 360      ( 26 Aug 2021 13:50 )             44.5         08-26    143  |  107  |  16  ----------------------------<  104<H>  3.8   |  23  |  0.7    Ca    9.7      26 Aug 2021 13:50    TPro  6.6  /  Alb  4.3  /  TBili  0.5  /  DBili  <0.2  /  AST  18  /  ALT  22  /  AlkPhos  63  08-26      CT Abdomen and Pelvis w/ Oral Cont and w/ IV Cont (08.26.21): No evidence of acute abdominopelvic pathology. Essentially unchanged imaging since 8/22/2020. Cholelithiasis

## 2021-08-26 NOTE — H&P ADULT - NSHPSOCIALHISTORY_GEN_ALL_CORE
Marital Status:  (   )    (   ) Single    (   )    (  )   Lives with: (  ) alone  (  ) children   (  ) spouse   (  ) parents  (  ) other  Recent Travel: No recent travel  Occupation:    Substance Use (street drugs): ( x ) never used  (  ) other:  Tobacco Usage:  ( x  ) never smoked   (   ) former smoker   (   ) current smoker  (     ) pack year  Alcohol Usage: None Marital Status:  (  X )    (   ) Single    (   )    (  )   Lives with: (  ) alone  (  ) children   (X  ) spouse   (  ) parents  (  ) other  Recent Travel: No recent travel  Occupation:    Substance Use (street drugs): ( x ) never used  (  ) other:  Tobacco Usage:  (  ) never smoked   (   ) former smoker   (   ) current smoker  (     ) pack year  Alcohol Usage: None Marital Status:  (  X )    (   ) Single    (   )    (  )   Lives with: (  ) alone  (  ) children   (X  ) spouse   (  ) parents  (  ) other  Recent Travel: No recent travel  Occupation:    Substance Use (street drugs): ( x ) never used  (  ) other:  Tobacco Usage:  (  ) never smoked   (   ) former smoker   ( x  ) current smoker  (     ) pack year  Alcohol Usage: None Marital Status:  (  X )    (   ) Single    (   )    (  )   Lives with: (  ) alone  (  ) children   (X  ) spouse   (  ) parents  (  ) other  Recent Travel: No recent travel  Occupation:    Substance Use (street drugs): ( x ) never used  (  ) other:  Tobacco Usage:  (  ) never smoked   (   ) former smoker   ( x  ) current smoker  (     ) pack year: 1 pack per day  Alcohol Usage: None

## 2021-08-26 NOTE — ED PROVIDER NOTE - ATTENDING CONTRIBUTION TO CARE
56 year old female w/ Past surgical history of gastric sleeve 2017 presents for right lower quadrant abdominal pain x 2 weeks which has been increasing in severity. Pain radiates to her right hip and worsens with movement, and sitting. Pain is 15/10 sharp. Patient has been taking ibuprofen and robaxin as well as cefpodoxime for uti, after beging seen in ED 8/22 & 8/24 although culture both times were negative. Patient's previous workup demonstrated negative ct abd/pelvis, negative duplex negative hip xray however patient states pain has been increasing causing her difficulty in ambulating which prompted the visit. No fever chills numbness/tingling back pain cp sob n/v/d urinary frequency urgency burning urinary incontinence saddle anesthesia. No history of STDS no vaginal discharge or vaginal bleeding.  On exam  CONSTITUTIONAL: WA / WN / NAD  HEAD: NCAT  EYES: PERRL; EOMI;   ENT: Normal pharynx; mucous membranes pink/moist, no erythema.  NECK: Supple; no meningeal signs  CARD: RRR; nl S1/S2; no M/R/G. Pulses equal bilaterally.  RESP: Respiratory rate and effort are normal; breath sounds clear and equal bilaterally.  ABD: Soft, ND no rigidity + ttp rlq. No cva  MSK/EXT: No gross deformities; full range of motion. No midline CTLS TTP no CVA no ttp   SKIN: Warm and dry;   NEURO: AAOx3,  PSYCH: Memory Intact, Normal Affect

## 2021-08-26 NOTE — ED PROVIDER NOTE - CLINICAL SUMMARY MEDICAL DECISION MAKING FREE TEXT BOX
56 year old female w/ Past surgical history of gastric sleeve 2017 presents for right lower quadrant abdominal pain x 2 weeks which has been increasing in severity. Pain radiates to her right hip and worsens with movement, and sitting. Pain is 15/10 sharp. Patient has been taking ibuprofen and robaxin as well as cefpodoxime for uti, after beging seen in ED 8/22 & 8/24 although culture both times were negative. Patient's previous workup demonstrated negative ct abd/pelvis, negative duplex negative hip xray however patient states pain has been increasing causing her difficulty in ambulating which prompted the visit. No fever chills numbness/tingling back pain cp sob n/v/d urinary frequency urgency burning urinary incontinence saddle anesthesia. No history of STDS no vaginal discharge or vaginal bleeding. VS reviewed. Labs imaging obtained and reviewed. Pelvic us negative, rpt ct negative. patient with continued pain will admit.

## 2021-08-26 NOTE — H&P ADULT - HISTORY OF PRESENT ILLNESS
56 yr old female with hx of Gastric sleeve presented to ER for persistent RLQ abd pain x1wk. Patient states pain is intermittent, worsening; exacerbated by moving from sitting to standing, certain positions; radiating to RT hip with minimal relief with ibuprofen/tylenol and heat pads. Patient presented to ER twice earlier this week with the same complain, CT abd with contrast was negative, UA showed positive leukocyte esterase and small amount of blood, followed by culture which was negative, rx'ed cefpodixime for possible UTI and Robaxin for possible musculoskeletal pain which she has been taking with no relief. She denies any fever, chills, nausea, vomiting, diarrhea, constipation, or urinary symptoms, vaginal pain, itching, or discharge.

## 2021-08-26 NOTE — ED ADULT NURSE NOTE - EXTENSIONS OF SELF_ADULT
Hospitalist daily progress note       Primary Doctor   Inocencia Mata MD      Patient:Valerie Jackson   Date of service : 3/25/2017 11:30 AM     :1945   Attending: Yusuf Ding MD     71 year old year old female        Chief complaint  - Weakness      Subjective   : Valerie Jackson is a 71 year old female who was admitted on 3/23/2017 for  Weakness    Patient seen and examined the bedside. Patient is 71-year-old female with a past medical history of COPD/depression/anxiety/type 2 diabetes mellitus/dementia/dyslipidemia/GERD presented to the emergency room after the patient is on floor unable to stand up week. Patient was evaluated by neurology and the exam was completely nonfocal CT was normal CPK normal/orthostatics negative EEG unremarkable. MRA of cervical spine and brain unchanged and the generalized weakness along with back pain was found to be multifactorial along with medications which include Wellbutrin/benzo/Cymbalta. Patient needs subacute rehabilitation. This morning when examined the patient complaining of back pain and requested the back pain will not improve with the therapy.    Review of Systems:    Constitutional: There is no history of fevers or chills  HEENT: Patient denies headache, blurred vision or photophobia  No history of ear pain, tinnitus or epistaxis  Cardiovascular: Denies history of chest pain or palpitations  Respiratory: There is no history of shortness of breath, orthopnea or paroxysmal nocturnal dyspnea  GI: No history of abdominal pain, nausea or vomiting  Genitourinary: There is no history of dysuria or hematuria  Musculoskeletal: Complaining of severe back pain with paraspinal tenderness  Neurologic: No history of numbness, tingling or weakness  Skin: No history of skin rashes        O:   Vital 24 Hour Range Most Recent Value   Temperature Temp  Min: 98.3 °F (36.8 °C)  Max: 98.6 °F (37 °C) 98.6 °F (37 °C)   Pulse Pulse  Min: 98  Max: 110 98   Respiratory Resp   Min: 18  Max: 20 18   Blood Pressure BP  Min: 117/71  Max: 169/82 150/90   Pulse Oximetry SpO2  Min: 93 %  Max: 98 %    O2 No Data Recorded      Vital Most Recent Value First Value   Weight 108.9 kg Weight: 108.9 kg   Height 5' 5\" (165.1 cm) Height: 5' 5\" (165.1 cm)       Medication :-  Reviewed  From [facility]     Physical Exam     Visit Vitals   • /90 (BP Location: [facility], Patient Position: Semi-Trivedi's)   • Pulse 98   • Temp 98.6 °F (37 °C) (Oral)   • Resp 18   • Ht 5' 5\" (1.651 m)   • Wt 108.9 kg   • SpO2 98%   • BMI 39.94 kg/m2       GENERAL: Alert, awake,not in distress or pain. Pt  is oriented to time, place and person.   HEENT: atraumatic, norme,anicteric sclerae. No oral thrush   NECK: Supple. No JVD, neck mass, thyromegaly.   LYMPH NODES: No cervical, clavicular LAP.   LUNGS: good respiratory effort. Clear to auscultation bilaterally. No wheezes. No crackles.   CHEST: no bruise or hematoma noted , no palpable tenderness noted   HEART: Regular rate and rhythm. S1, S2 well heard. NO  murmur,no rubs or gallops. PMI is not shifted. No heaves or thrills in the precordium.   ABDOMEN: Flat, not distended, soft and nontender. No guarding, no rigidity. No masses, no hepatosplenomegaly.   GENITOURINARY: No CVA or suprapubic tenderness.   MUSCULOSKELETAL: Able to move all 4 extremities adequately. No edema, no clubbing, no cyanosis. Severe back pain with paraspinal tenderness.  NEUROLOGIC: Cranial nerves II through XII intact. Power is 5/5 in upper and lower extremities flexor and extensor muscle groups bilaterally. Sensation to light touch is intact in all UE, LE, bilaterally.   SKIN:no  Bruise hematoma or rash  noted    PSYCHIATRY: normal affect, mood. Speech, memory, concentration intact.   PERRLA, pink conjunctiva        Labs     Recent Labs  Lab 03/25/17  0558 03/24/17  0458 03/23/17  0340   CO2 29 29 25   BUN 11 11 15   CREATININE 0.77 0.82 0.79   CALCIUM 9.0 9.2 9.2   ALBUMIN 3.2* 3.0* 3.2*   AST 70* 70* 71*  None   ANIONGAP 12 12 17      Recent Labs  Lab 03/25/17  0558 03/24/17  0458 03/23/17  0340   WBC 3.5* 3.2* 5.1   HGB 11.6* 11.2* 11.5*   HCT 37.3 35.1* 36.4   MCV 84.0 83.8 83.7   RBC 4.44 4.19 4.35   MCH 26.1 26.7 26.4   MCHC 31.1* 31.9* 31.6*   * 119* 124*      No results found  No components found for: POCGLU    Reviewed pertinent -  PMH,PSH,social history and FH   Imaging      MRI CERVICAL SPINE   Final Result   IMPRESSION:       Degenerative changes in the cervical spine are stable and most conspicuous   at C6-C7, where there is moderate to marked narrowing of the spinal canal   and severe narrowing of the neural foramina due to the combination of   posterior disc/osteophyte complex, thickening of the ligamentum flavum,   bilateral uncovertebral joint hypertrophy and facet arthropathy.         MRI BRAIN   Final Result   IMPRESSION:       1.  No acute intravenous findings.   2.  A well age-related atrophy and chronic microvascular ischemic changes.             XR Chest AP or PA   Final Result   Impression: Clear lungs         CT Cervical Spine   Final Result   Impression: No acute bone or joint abnormality.         CT Head Brain   Final Result   Impression: No acute intracranial abnormality.               Admit Weight:   Weight: 108.9 kg (03/23/17 0338)    Weight over the past 48 Hours:  Patient Vitals for the past 48 hrs:   Weight   03/24/17 0612 107.9 kg   03/25/17 0500 108.9 kg        Last Stool Occurrence: 1 (03/23/17 0900)  Assessment & Plan  1. Generalized weakness/frequent falls and difficult ambulation/back pain secondary to multifactorial/polypharmacy/diabetic neuropathy/cervical myelopathy/physical deconditioning.  Neuro exam nonfocal and seen by Dr. Loera /MRI cervical spine brain was negative.  EEG unremarkable.  Hold Wellbutrin/benzo will continue Cymbalta.  Continue PT OT/pending rehabilitation.  History of cataract need outpatient follow-up with ophthalmology.    2. Type 2 diabetes mellitus  last A1c 10.7 blood sugars poorly controlled. Hold on oral medications for now we'll increase Lantus to 70 units along with premade 30 units 3 times daily/sliding scale carbohydrate diet.    3. GERD continue pantoprazole    4. Benign essential hypertension continue amlodipine  5. History of COPD continue Advair along with Spiriva  6. Pancytopenia we'll hold subcutaneous anticoagulation.      Condition of patient is fair      DVT/VTE Prophylaxis:    VTE Pharmacologic Prophylaxis: No pharmacologic Venous Thromboembolism prophylaxis due to Platelet count less than 150,000  VTE Mechanical Prophylaxis: Yes    Tentative Discharge plan --  PAL    Barriers: DC to subacute rate  Destination: Skilled nursing facility    Goals of Care:  Patient is decisional: Yes  Patient has POA documents in the chart: yez  Code Status: No code MMS    Above plan of care was discussed with patient  In detail,  All questions were answered and patient agreed with that.    DW - Patient and RN -    Please contact the attending Hospitalist listed in EPIC caring for the patient until 7 pm. From 7pm to 7:00 am (night) contact the Hospitalist on call at [phone] for any patient care related issues.    Yusuf Ding MD   3/25/2017  11:30 AM    Attending hospitalist   [facility]- hospitalist   [facility]

## 2021-08-26 NOTE — ED PROVIDER NOTE - OBJECTIVE STATEMENT
56yF pmhx gastric sleeve c/o RLQ abd pain x1wk; intermittent, worsening; exacerbated by moving from sitting to standing, certain positions; radiating to RT hip; minimal relief w/ ibuprofen/tylenol and heat pads; denies any nausea, vomiting, diarrhea, constipation, or urinary symptoms. She also denies fever, chills, or night sweats. She denies vaginal pain, itching, or discharge.   w/u in ED two times this week for the same complaint--neg CT abdomen/pelvis w/ IV contrast, UA showed positive leukocyte esterase and small amount of blood, followed by culture which was negative, rx'ed cefpodixime for possible UTI and Robaxin for possible musculoskeletal pain which she has been taking.

## 2021-08-26 NOTE — ED SUB INTERN NOTE - OBJECTIVE STATEMENT FT
Patient is a 54 year old female with history of gastric sleeve who presents complaining of right lower abdominal pain that has worsened the past week. Patient presented to [facility] ED two times this week for the same complaint. Previous workup included CT abdomen/pelvis w/ IV contrast which was negative for kidney stone, appendicitis, or acute intraabdominal pathology, and UA which showed positive leukocyte esterase and small amount of blood, followed by culture which was negative. Patient was discharged with cefpodixime for possible UTI and muscle relaxers ***THIS NOTE IS NOT COMPLETE YET*** Patient is a 56 year old female with history of gastric sleeve who presents complaining of right lower abdominal pain that has worsened the past week. Patient presented to [facility] ED two times this week for the same complaint. Previous workup included CT abdomen/pelvis w/ IV contrast which was negative for kidney stone, appendicitis, or acute intraabdominal pathology, and UA which showed positive leukocyte esterase and small amount of blood, followed by culture which was negative. Patient was discharged with cefpodixime for possible UTI and Robaxin for possible musculoskeletal pain, which she has been taking.   Today, patient reports her pain is still persistent and not improving. It is intermittent, radiates up towards her right hip, is worse with sitting or rising from seated position with severity of 10/10, and patient struggles to find a comfortable position that alleviates pain. She has been using ibuprofen/acetaminophen and a heat pad at home, which have helped minimally. She denies any nausea, vomiting, diarrhea, constipation, or urinary symptoms. She also denies fever, chills, or night sweats. She denies vaginal pain, itching, or discharge.

## 2021-08-26 NOTE — H&P ADULT - NSHPPHYSICALEXAM_GEN_ALL_CORE
T(C): 36.2 (08-26-21 @ 18:55), Max: 37 (08-26-21 @ 12:43)  HR: 68 (08-26-21 @ 19:36) (65 - 78)  BP: 164/77 (08-26-21 @ 19:36) (144/67 - 195/103)  RR: 18 (08-26-21 @ 19:36) (16 - 18)  SpO2: 98% (08-26-21 @ 19:36) (98% - 100%)        GENERAL: NAD, well-developed  HEAD:  Atraumatic, Normocephalic  EYES: EOMI, PERRLA, conjunctiva and sclera clear  ENT: Normal tympanic membrane. No nasal obstruction or discharge. No tonsillar exudate, swelling or erythema.  NECK: Supple, No JVD  CHEST/LUNG: Clear to auscultation bilaterally; No wheeze  HEART: Regular rate and rhythm; No murmurs, rubs, or gallops  ABDOMEN: Soft, Nontender, Nondistended; Bowel sounds present  EXTREMITIES:  2+ Peripheral Pulses, No clubbing, cyanosis, or edema  PSYCH: AAOx3  NEUROLOGY: non-focal  SKIN: No rashes or lesions T(C): 36.2 (08-26-21 @ 18:55), Max: 37 (08-26-21 @ 12:43)  HR: 68 (08-26-21 @ 19:36) (65 - 78)  BP: 164/77 (08-26-21 @ 19:36) (144/67 - 195/103)  RR: 18 (08-26-21 @ 19:36) (16 - 18)  SpO2: 98% (08-26-21 @ 19:36) (98% - 100%)      GENERAL: NAD, well-developed  HEAD:  Atraumatic, Normocephalic  EYES: EOMI, PERRLA, conjunctiva and sclera clear  ENT: Normal tympanic membrane. No nasal obstruction or discharge. No tonsillar exudate, swelling or erythema.  NECK: Supple, No JVD  CHEST/LUNG: Clear to auscultation bilaterally; No wheeze  HEART: Regular rate and rhythm; No murmurs, rubs, or gallops  ABDOMEN: Soft, Nontender, Nondistended; Bowel sounds present  EXTREMITIES:  2+ Peripheral Pulses, No clubbing, cyanosis, or edema  PSYCH: AAOx3  NEUROLOGY: non-focal  SKIN: No rashes or lesions T(C): 36.2 (08-26-21 @ 18:55), Max: 37 (08-26-21 @ 12:43)  HR: 68 (08-26-21 @ 19:36) (65 - 78)  BP: 164/77 (08-26-21 @ 19:36) (144/67 - 195/103)  RR: 18 (08-26-21 @ 19:36) (16 - 18)  SpO2: 98% (08-26-21 @ 19:36) (98% - 100%)      GENERAL: NAD, well-developed  HEAD:  Atraumatic, Normocephalic  EYES: EOMI, PERRLA, conjunctiva and sclera clear  ENT: Normal tympanic membrane. No nasal obstruction or discharge. No tonsillar exudate, swelling or erythema.  NECK: Supple, No JVD  CHEST/LUNG: Clear to auscultation bilaterally; No wheeze  HEART: Regular rate and rhythm; No murmurs, rubs, or gallops  ABDOMEN: Soft, Nontender, Nondistended; Bowel sounds present  EXTREMITIES:  2+ Peripheral Pulses, No clubbing, cyanosis, or edema  PSYCH: AAOx3  NEUROLOGY: non-focal  SKIN: No rashes or lesions  MSK: right groin pain on passive rotation on hip

## 2021-08-27 LAB
ANION GAP SERPL CALC-SCNC: 13 MMOL/L — SIGNIFICANT CHANGE UP (ref 7–14)
BASOPHILS # BLD AUTO: 0.06 K/UL — SIGNIFICANT CHANGE UP (ref 0–0.2)
BASOPHILS NFR BLD AUTO: 0.8 % — SIGNIFICANT CHANGE UP (ref 0–1)
BUN SERPL-MCNC: 18 MG/DL — SIGNIFICANT CHANGE UP (ref 10–20)
C TRACH RRNA SPEC QL NAA+PROBE: SIGNIFICANT CHANGE UP
CALCIUM SERPL-MCNC: 9.8 MG/DL — SIGNIFICANT CHANGE UP (ref 8.5–10.1)
CHLORIDE SERPL-SCNC: 105 MMOL/L — SIGNIFICANT CHANGE UP (ref 98–110)
CO2 SERPL-SCNC: 22 MMOL/L — SIGNIFICANT CHANGE UP (ref 17–32)
CREAT SERPL-MCNC: 0.7 MG/DL — SIGNIFICANT CHANGE UP (ref 0.7–1.5)
CULTURE RESULTS: SIGNIFICANT CHANGE UP
EOSINOPHIL # BLD AUTO: 0.08 K/UL — SIGNIFICANT CHANGE UP (ref 0–0.7)
EOSINOPHIL NFR BLD AUTO: 1 % — SIGNIFICANT CHANGE UP (ref 0–8)
ERYTHROCYTE [SEDIMENTATION RATE] IN BLOOD: 12 MM/HR — SIGNIFICANT CHANGE UP (ref 0–20)
GLUCOSE SERPL-MCNC: 125 MG/DL — HIGH (ref 70–99)
HCT VFR BLD CALC: 44 % — SIGNIFICANT CHANGE UP (ref 37–47)
HCV AB S/CO SERPL IA: 0.03 COI — SIGNIFICANT CHANGE UP
HCV AB SERPL-IMP: SIGNIFICANT CHANGE UP
HGB BLD-MCNC: 14.4 G/DL — SIGNIFICANT CHANGE UP (ref 12–16)
IMM GRANULOCYTES NFR BLD AUTO: 0.6 % — HIGH (ref 0.1–0.3)
LYMPHOCYTES # BLD AUTO: 1.33 K/UL — SIGNIFICANT CHANGE UP (ref 1.2–3.4)
LYMPHOCYTES # BLD AUTO: 16.7 % — LOW (ref 20.5–51.1)
MAGNESIUM SERPL-MCNC: 2.1 MG/DL — SIGNIFICANT CHANGE UP (ref 1.8–2.4)
MCHC RBC-ENTMCNC: 29.5 PG — SIGNIFICANT CHANGE UP (ref 27–31)
MCHC RBC-ENTMCNC: 32.7 G/DL — SIGNIFICANT CHANGE UP (ref 32–37)
MCV RBC AUTO: 90.2 FL — SIGNIFICANT CHANGE UP (ref 81–99)
MONOCYTES # BLD AUTO: 0.28 K/UL — SIGNIFICANT CHANGE UP (ref 0.1–0.6)
MONOCYTES NFR BLD AUTO: 3.5 % — SIGNIFICANT CHANGE UP (ref 1.7–9.3)
N GONORRHOEA RRNA SPEC QL NAA+PROBE: SIGNIFICANT CHANGE UP
NEUTROPHILS # BLD AUTO: 6.18 K/UL — SIGNIFICANT CHANGE UP (ref 1.4–6.5)
NEUTROPHILS NFR BLD AUTO: 77.4 % — HIGH (ref 42.2–75.2)
NRBC # BLD: 0 /100 WBCS — SIGNIFICANT CHANGE UP (ref 0–0)
PLATELET # BLD AUTO: 326 K/UL — SIGNIFICANT CHANGE UP (ref 130–400)
POTASSIUM SERPL-MCNC: 4.5 MMOL/L — SIGNIFICANT CHANGE UP (ref 3.5–5)
POTASSIUM SERPL-SCNC: 4.5 MMOL/L — SIGNIFICANT CHANGE UP (ref 3.5–5)
RBC # BLD: 4.88 M/UL — SIGNIFICANT CHANGE UP (ref 4.2–5.4)
RBC # FLD: 13.6 % — SIGNIFICANT CHANGE UP (ref 11.5–14.5)
SODIUM SERPL-SCNC: 140 MMOL/L — SIGNIFICANT CHANGE UP (ref 135–146)
SPECIMEN SOURCE: SIGNIFICANT CHANGE UP
SPECIMEN SOURCE: SIGNIFICANT CHANGE UP
WBC # BLD: 7.98 K/UL — SIGNIFICANT CHANGE UP (ref 4.8–10.8)
WBC # FLD AUTO: 7.98 K/UL — SIGNIFICANT CHANGE UP (ref 4.8–10.8)

## 2021-08-27 PROCEDURE — 99232 SBSQ HOSP IP/OBS MODERATE 35: CPT

## 2021-08-27 RX ORDER — NICOTINE POLACRILEX 2 MG
1 GUM BUCCAL DAILY
Refills: 0 | Status: DISCONTINUED | OUTPATIENT
Start: 2021-08-27 | End: 2021-08-30

## 2021-08-27 RX ADMIN — MORPHINE SULFATE 4 MILLIGRAM(S): 50 CAPSULE, EXTENDED RELEASE ORAL at 14:55

## 2021-08-27 RX ADMIN — MORPHINE SULFATE 4 MILLIGRAM(S): 50 CAPSULE, EXTENDED RELEASE ORAL at 15:17

## 2021-08-27 RX ADMIN — MORPHINE SULFATE 4 MILLIGRAM(S): 50 CAPSULE, EXTENDED RELEASE ORAL at 20:31

## 2021-08-27 RX ADMIN — Medication 100 MILLIGRAM(S): at 05:05

## 2021-08-27 RX ADMIN — MORPHINE SULFATE 4 MILLIGRAM(S): 50 CAPSULE, EXTENDED RELEASE ORAL at 07:40

## 2021-08-27 RX ADMIN — MORPHINE SULFATE 4 MILLIGRAM(S): 50 CAPSULE, EXTENDED RELEASE ORAL at 19:30

## 2021-08-27 RX ADMIN — METHOCARBAMOL 1500 MILLIGRAM(S): 500 TABLET, FILM COATED ORAL at 21:21

## 2021-08-27 RX ADMIN — MORPHINE SULFATE 4 MILLIGRAM(S): 50 CAPSULE, EXTENDED RELEASE ORAL at 00:00

## 2021-08-27 RX ADMIN — Medication 100 MILLIGRAM(S): at 17:41

## 2021-08-27 RX ADMIN — METHOCARBAMOL 1500 MILLIGRAM(S): 500 TABLET, FILM COATED ORAL at 05:05

## 2021-08-27 RX ADMIN — METHOCARBAMOL 1500 MILLIGRAM(S): 500 TABLET, FILM COATED ORAL at 13:02

## 2021-08-27 RX ADMIN — Medication 40 MILLIGRAM(S): at 05:05

## 2021-08-27 RX ADMIN — MORPHINE SULFATE 4 MILLIGRAM(S): 50 CAPSULE, EXTENDED RELEASE ORAL at 06:58

## 2021-08-27 RX ADMIN — PANTOPRAZOLE SODIUM 40 MILLIGRAM(S): 20 TABLET, DELAYED RELEASE ORAL at 06:58

## 2021-08-28 LAB
COVID-19 SPIKE DOMAIN AB INTERP: POSITIVE
COVID-19 SPIKE DOMAIN ANTIBODY RESULT: >250 U/ML — HIGH
SARS-COV-2 IGG+IGM SERPL QL IA: >250 U/ML — HIGH
SARS-COV-2 IGG+IGM SERPL QL IA: POSITIVE

## 2021-08-28 PROCEDURE — 99231 SBSQ HOSP IP/OBS SF/LOW 25: CPT

## 2021-08-28 RX ORDER — MORPHINE SULFATE 50 MG/1
2 CAPSULE, EXTENDED RELEASE ORAL EVERY 4 HOURS
Refills: 0 | Status: DISCONTINUED | OUTPATIENT
Start: 2021-08-28 | End: 2021-08-30

## 2021-08-28 RX ADMIN — Medication 1 PATCH: at 11:11

## 2021-08-28 RX ADMIN — Medication 100 MILLIGRAM(S): at 05:56

## 2021-08-28 RX ADMIN — Medication 1 PATCH: at 19:19

## 2021-08-28 RX ADMIN — MORPHINE SULFATE 2 MILLIGRAM(S): 50 CAPSULE, EXTENDED RELEASE ORAL at 15:25

## 2021-08-28 RX ADMIN — Medication 40 MILLIGRAM(S): at 05:56

## 2021-08-28 RX ADMIN — Medication 15 MILLIGRAM(S): at 04:45

## 2021-08-28 RX ADMIN — METHOCARBAMOL 1500 MILLIGRAM(S): 500 TABLET, FILM COATED ORAL at 13:05

## 2021-08-28 RX ADMIN — MORPHINE SULFATE 2 MILLIGRAM(S): 50 CAPSULE, EXTENDED RELEASE ORAL at 15:02

## 2021-08-28 RX ADMIN — Medication 15 MILLIGRAM(S): at 04:11

## 2021-08-28 RX ADMIN — Medication 100 MILLIGRAM(S): at 17:04

## 2021-08-28 RX ADMIN — METHOCARBAMOL 1500 MILLIGRAM(S): 500 TABLET, FILM COATED ORAL at 22:15

## 2021-08-28 RX ADMIN — PANTOPRAZOLE SODIUM 40 MILLIGRAM(S): 20 TABLET, DELAYED RELEASE ORAL at 05:56

## 2021-08-28 RX ADMIN — METHOCARBAMOL 1500 MILLIGRAM(S): 500 TABLET, FILM COATED ORAL at 05:56

## 2021-08-29 PROCEDURE — 99231 SBSQ HOSP IP/OBS SF/LOW 25: CPT

## 2021-08-29 PROCEDURE — 72197 MRI PELVIS W/O & W/DYE: CPT | Mod: 26

## 2021-08-29 RX ADMIN — METHOCARBAMOL 1500 MILLIGRAM(S): 500 TABLET, FILM COATED ORAL at 22:14

## 2021-08-29 RX ADMIN — MORPHINE SULFATE 2 MILLIGRAM(S): 50 CAPSULE, EXTENDED RELEASE ORAL at 02:35

## 2021-08-29 RX ADMIN — Medication 40 MILLIGRAM(S): at 06:51

## 2021-08-29 RX ADMIN — METHOCARBAMOL 1500 MILLIGRAM(S): 500 TABLET, FILM COATED ORAL at 16:11

## 2021-08-29 RX ADMIN — Medication 100 MILLIGRAM(S): at 17:44

## 2021-08-29 RX ADMIN — Medication 1 PATCH: at 16:05

## 2021-08-29 RX ADMIN — PANTOPRAZOLE SODIUM 40 MILLIGRAM(S): 20 TABLET, DELAYED RELEASE ORAL at 06:51

## 2021-08-29 RX ADMIN — Medication 100 MILLIGRAM(S): at 06:51

## 2021-08-29 RX ADMIN — MORPHINE SULFATE 2 MILLIGRAM(S): 50 CAPSULE, EXTENDED RELEASE ORAL at 02:16

## 2021-08-29 RX ADMIN — Medication 1 PATCH: at 11:44

## 2021-08-29 RX ADMIN — METHOCARBAMOL 1500 MILLIGRAM(S): 500 TABLET, FILM COATED ORAL at 06:51

## 2021-08-29 RX ADMIN — Medication 1 PATCH: at 06:56

## 2021-08-29 RX ADMIN — Medication 1 PATCH: at 22:15

## 2021-08-29 NOTE — PROGRESS NOTE ADULT - ASSESSMENT
56 yr old female with no medical history admitted for abdominal pain.     # Acute Groin pain   - r/o right hip joint disease   - pain severe enough to limit ambulation   - Repeat CT Abdomen and Pelvis w/ Oral Cont and w/ IV Cont (08.26.21): No evidence of acute abdominopelvic pathology. Essentially unchanged imaging since 8/22/2020. Cholelithiasis  - pain control with ketorolac  - start pred 40mg PO daily for 4 days   - MR pelvis without contrast ordered     # Nicotine Abuse  - counselled to quit smoking  - refused nicotine patch     # DVT ppx  - start Lovenox     # DASH diet    # Ambulate as tolerated    # Full code  
56 yr old female with no medical history admitted for abdominal pain.     # Acute Groin pain   - r/o right hip joint disease   - pain severe enough to limit ambulation   - Repeat CT Abdomen and Pelvis w/ Oral Cont and w/ IV Cont (08.26.21): No evidence of acute abdominopelvic pathology. Essentially unchanged imaging since 8/22/2020. Cholelithiasis  - pain control with ketorolac  - start pred 40mg PO daily for 4 days   - MR pelvis without contrast ordered-discussed with administration to expedite     # Nicotine Abuse  - counselled to quit smoking  - refused nicotine patch     # DVT ppx  - start Lovenox     # DASH diet    # Ambulate as tolerated    # Full code  
56 yr old female with no medical history admitted for abdominal pain.     # Acute Groin pain   - r/o right hip joint disease   - pain severe enough to limit ambulation   - Repeat CT Abdomen and Pelvis w/ Oral Cont and w/ IV Cont (08.26.21): No evidence of acute abdominopelvic pathology. Essentially unchanged imaging since 8/22/2020. Cholelithiasis  - pain control with ketorolac  - start pred 40mg PO daily for 4 days   - MR pelvis without contrast ordered-discussed with administration to expedite     # Nicotine Abuse  - counselled to quit smoking  - refused nicotine patch     # DVT ppx  - start Lovenox     # DASH diet    # Ambulate as tolerated    # Full code

## 2021-08-29 NOTE — PROGRESS NOTE ADULT - SUBJECTIVE AND OBJECTIVE BOX
56 yr old female with no medical history admitted for abdominal pain.     Today:  Seen at bedside, no new complaints.        REVIEW OF SYSTEMS:  No new complaints.      MEDICATIONS  (STANDING):  cefpodoxime 100 milliGRAM(s) Oral every 12 hours  enoxaparin Injectable 40 milliGRAM(s) SubCutaneous at bedtime  methocarbamol 1500 milliGRAM(s) Oral three times a day  pantoprazole    Tablet 40 milliGRAM(s) Oral before breakfast  predniSONE   Tablet 40 milliGRAM(s) Oral daily    MEDICATIONS  (PRN):  ketorolac   Injectable 15 milliGRAM(s) IV Push three times a day PRN Moderate Pain (4 - 6)  morphine  - Injectable 4 milliGRAM(s) IV Push every 4 hours PRN Moderate Pain (4 - 6)      Allergies  No Known Allergies        FAMILY HISTORY:  No pertinent family history in first degree relatives        Vital Signs Last 24 Hrs  T(C): 35.9 (27 Aug 2021 05:20), Max: 36.7 (26 Aug 2021 21:21)  T(F): 96.7 (27 Aug 2021 05:20), Max: 98 (26 Aug 2021 21:21)  HR: 62 (27 Aug 2021 05:20) (59 - 68)  BP: 141/69 (27 Aug 2021 05:20) (141/69 - 195/103)  RR: 18 (27 Aug 2021 05:20) (16 - 18)  SpO2: 98% (26 Aug 2021 19:36) (98% - 100%)    PHYSICAL EXAM:  GENERAL: NAD, well-groomed, well-developed  HEAD:  Atraumatic, Normocephalic  EYES: EOMI, PERRLA, conjunctiva and sclera clear  ENMT: No tonsillar erythema, exudates, or enlargement; Moist mucous membranes, Good dentition, No lesions  NECK: Supple, No JVD, Normal thyroid  NERVOUS SYSTEM:  Alert & Oriented X3, Good concentration  CHEST/LUNG: Clear to percussion bilaterally; No rales, rhonchi, wheezing, or rubs  HEART: Regular rate and rhythm; No murmurs, rubs, or gallops  ABDOMEN: Soft, Nontender, Nondistended; Bowel sounds present  EXTREMITIES:  2+ Peripheral Pulses, No clubbing, cyanosis, or edema  SKIN: No rashes or lesions      LABS:                        14.4   7.98  )-----------( 326      ( 27 Aug 2021 08:17 )             44.0     08    140  |  105  |  18  ----------------------------<  125<H>  4.5   |  22  |  0.7    Ca    9.8      27 Aug 2021 08:17  Mg     2.1         TPro  6.6  /  Alb  4.3  /  TBili  0.5  /  DBili  <0.2  /  AST  18  /  ALT  22  /  AlkPhos  63        Urinalysis Basic - ( 26 Aug 2021 14:00 )    Color: Yellow / Appearance: Clear / S.015 / pH: x  Gluc: x / Ketone: Negative  / Bili: Negative / Urobili: 0.2 mg/dL   Blood: x / Protein: Negative mg/dL / Nitrite: Negative   Leuk Esterase: Trace / RBC: Negative / WBC 6-10 /HPF   Sq Epi: x / Non Sq Epi: Moderate /HPF / Bacteria: x      
56 yr old female with no medical history admitted for abdominal pain.     Today:  Seen at bedside, still complains of pelvic pain.        REVIEW OF SYSTEMS:  No new complaints.      MEDICATIONS  (STANDING):  cefpodoxime 100 milliGRAM(s) Oral every 12 hours  enoxaparin Injectable 40 milliGRAM(s) SubCutaneous at bedtime  methocarbamol 1500 milliGRAM(s) Oral three times a day  nicotine -  14 mG/24Hr(s) Patch 1 patch Transdermal daily  pantoprazole    Tablet 40 milliGRAM(s) Oral before breakfast  predniSONE   Tablet 40 milliGRAM(s) Oral daily    MEDICATIONS  (PRN):  ketorolac   Injectable 15 milliGRAM(s) IV Push three times a day PRN Moderate Pain (4 - 6)  LORazepam   Injectable 1 milliGRAM(s) IV Push once PRN Anxiety  morphine  - Injectable 2 milliGRAM(s) IV Push every 4 hours PRN Severe Pain (7 - 10)      Allergies  No Known Allergies        FAMILY HISTORY:  No pertinent family history in first degree relatives        Vital Signs Last 24 Hrs  T(C): 35.9 (29 Aug 2021 05:00), Max: 36.3 (28 Aug 2021 21:00)  T(F): 96.6 (29 Aug 2021 05:00), Max: 97.4 (28 Aug 2021 21:00)  HR: 64 (29 Aug 2021 05:00) (64 - 72)  BP: 133/62 (29 Aug 2021 05:00) (133/62 - 148/67)  RR: 16 (29 Aug 2021 05:00) (16 - 16)      PHYSICAL EXAM:  GENERAL: NAD, well-groomed, well-developed  HEAD:  Atraumatic, Normocephalic  EYES: EOMI, PERRLA, conjunctiva and sclera clear  NECK: Supple, No JVD, Normal thyroid  NERVOUS SYSTEM:  Alert & Oriented X3, Good concentration  CHEST/LUNG: Clear to percussion bilaterally; No rales, rhonchi, wheezing, or rubs  HEART: Regular rate and rhythm; No murmurs, rubs, or gallops  ABDOMEN: Soft, Nontender, Nondistended; Bowel sounds present      
56 yr old female with no medical history admitted for abdominal pain.     Today:  Seen at bedside, no new complaints.        REVIEW OF SYSTEMS:  No new complaints.        MEDICATIONS  (STANDING):  cefpodoxime 100 milliGRAM(s) Oral every 12 hours  enoxaparin Injectable 40 milliGRAM(s) SubCutaneous at bedtime  methocarbamol 1500 milliGRAM(s) Oral three times a day  nicotine -  14 mG/24Hr(s) Patch 1 patch Transdermal daily  pantoprazole    Tablet 40 milliGRAM(s) Oral before breakfast  predniSONE   Tablet 40 milliGRAM(s) Oral daily    MEDICATIONS  (PRN):  ketorolac   Injectable 15 milliGRAM(s) IV Push three times a day PRN Moderate Pain (4 - 6)  morphine  - Injectable 2 milliGRAM(s) IV Push every 4 hours PRN Severe Pain (7 - 10)      Allergies  No Known Allergies        FAMILY HISTORY:  No pertinent family history in first degree relatives        Vital Signs Last 24 Hrs  T(C): 36.5 (28 Aug 2021 13:20), Max: 36.8 (27 Aug 2021 21:34)  T(F): 97.7 (28 Aug 2021 13:20), Max: 98.3 (27 Aug 2021 21:34)  HR: 73 (28 Aug 2021 13:20) (60 - 73)  BP: 141/74 (28 Aug 2021 13:20) (131/65 - 141/74)  RR: 16 (28 Aug 2021 13:20) (16 - 18)      PHYSICAL EXAM:  GENERAL: NAD, well-groomed, well-developed  HEAD:  Atraumatic, Normocephalic  EYES: EOMI, PERRLA, conjunctiva and sclera clear  NECK: Supple, No JVD, Normal thyroid  NERVOUS SYSTEM:  Alert & Oriented X3, Good concentration  CHEST/LUNG: Clear to percussion bilaterally; No rales, rhonchi, wheezing, or rubs  HEART: Regular rate and rhythm; No murmurs, rubs, or gallops  ABDOMEN: Soft, Nontender, Nondistended; Bowel sounds present  EXTREMITIES:  LE ROM limited due to pain      LABS:                        14.4   7.98  )-----------( 326      ( 27 Aug 2021 08:17 )             44.0     08-27    140  |  105  |  18  ----------------------------<  125<H>  4.5   |  22  |  0.7    Ca    9.8      27 Aug 2021 08:17  Mg     2.1     08-27

## 2021-08-30 ENCOUNTER — TRANSCRIPTION ENCOUNTER (OUTPATIENT)
Age: 56
End: 2021-08-30

## 2021-08-30 VITALS
TEMPERATURE: 98 F | RESPIRATION RATE: 18 BRPM | SYSTOLIC BLOOD PRESSURE: 133 MMHG | HEART RATE: 71 BPM | DIASTOLIC BLOOD PRESSURE: 88 MMHG

## 2021-08-30 PROCEDURE — 99238 HOSP IP/OBS DSCHRG MGMT 30/<: CPT

## 2021-08-30 RX ORDER — METHOCARBAMOL 500 MG/1
2 TABLET, FILM COATED ORAL
Qty: 0 | Refills: 0 | DISCHARGE
Start: 2021-08-30

## 2021-08-30 RX ORDER — METHOCARBAMOL 500 MG/1
2 TABLET, FILM COATED ORAL
Qty: 84 | Refills: 0
Start: 2021-08-30 | End: 2021-09-12

## 2021-08-30 RX ADMIN — Medication 100 MILLIGRAM(S): at 05:25

## 2021-08-30 RX ADMIN — Medication 1 PATCH: at 12:03

## 2021-08-30 RX ADMIN — Medication 15 MILLIGRAM(S): at 08:24

## 2021-08-30 RX ADMIN — METHOCARBAMOL 1500 MILLIGRAM(S): 500 TABLET, FILM COATED ORAL at 13:47

## 2021-08-30 RX ADMIN — METHOCARBAMOL 1500 MILLIGRAM(S): 500 TABLET, FILM COATED ORAL at 05:25

## 2021-08-30 RX ADMIN — Medication 1 PATCH: at 07:52

## 2021-08-30 RX ADMIN — Medication 15 MILLIGRAM(S): at 09:00

## 2021-08-30 RX ADMIN — PANTOPRAZOLE SODIUM 40 MILLIGRAM(S): 20 TABLET, DELAYED RELEASE ORAL at 05:25

## 2021-08-30 RX ADMIN — Medication 40 MILLIGRAM(S): at 05:25

## 2021-08-30 NOTE — PHYSICAL THERAPY INITIAL EVALUATION ADULT - GAIT DEVIATIONS NOTED, PT EVAL
forward flexed trunk, dec heel strike and push off, antalgic qualities on R LE/decreased iwona/decreased step length/decreased stride length/decreased weight-shifting ability

## 2021-08-30 NOTE — DISCHARGE NOTE PROVIDER - CARE PROVIDER_API CALL
Juan Pineda (MD)  Orthopaedic Surgery  [address] [place] [address]  Phone: [phone]  Fax: [phone]  Follow Up Time:

## 2021-08-30 NOTE — DISCHARGE NOTE NURSING/CASE MANAGEMENT/SOCIAL WORK - PATIENT PORTAL LINK FT
You can access the FollowMyHealth Patient Portal offered by [facility] by registering at the following website: [email or web address]. By joining [facility]’s FollowMyHealth portal, you will also be able to view your health information using other applications (apps) compatible with our system.

## 2021-08-30 NOTE — PHYSICAL THERAPY INITIAL EVALUATION ADULT - ADDITIONAL COMMENTS
Pt reports she lives in a private house with her - 2 steps with rail to enter then bedroom/bathroom on first floor. Pt did not ambulate with AD prior to admission.

## 2021-08-30 NOTE — DISCHARGE NOTE NURSING/CASE MANAGEMENT/SOCIAL WORK - NSDCPEFALRISK_GEN_ALL_CORE
For information on Fall & injury Prevention, visit [email or web address]

## 2021-08-30 NOTE — DISCHARGE NOTE PROVIDER - HOSPITAL COURSE
56 yr old female with no medical history admitted for abdominal pain.     # Acute Groin pain   - Repeat CT Abdomen and Pelvis w/ Oral Cont and w/ IV Cont (08.26.21): No evidence of acute abdominopelvic pathology. Essentially unchanged imaging since 8/22/2020. Cholelithiasis  - MR pelvis without contrast shows:  Impression:  Partial thickness insertional tear of the bilateral rectus femoris tendons at the anterior inferior iliac spine.  Bilateral gluteus minimus and gluteus medius insertional tendinosis with small greater trochanteric bursitis.  Small bilateral hip joint effusions. Minimal osteoarthritis of the right hip.  Osteoarthritis of the visualized lower lumbar spine.  Intact bilateral rectus abdominis-adductor longus aponeurotic plates.  -Discussed with orthopedics, no need for intervention.  Per PT patient would benefit from outpatient PT.  -Stable for DC to home    # Nicotine Abuse  - counselled to quit smoking  - refused nicotine patch       # DASH diet    # Ambulate as tolerated

## 2021-08-30 NOTE — PHYSICAL THERAPY INITIAL EVALUATION ADULT - GENERAL OBSERVATIONS, REHAB EVAL
Pt encountered semi-reclined in bed, NAD,  bedside, ok to be seen by PT as requested by Dr. Bowling and pt agreeable. +chart reviewed - MRI reveals "Partial thickness insertional tear of the bilateral rectus femoris tendons at the anterior inferior iliac spine" - per Dr. Bowling - no ortho intervention, WBAT B LEs

## 2021-08-30 NOTE — DISCHARGE NOTE PROVIDER - NSDCMRMEDTOKEN_GEN_ALL_CORE_FT
oxycodone-acetaminophen 5 mg-325 mg oral tablet: 1 tab(s) orally every 6 hours MDD:4 tabletes  Robaxin-750 oral tablet: 2 tab(s) orally 3 times a day  Robaxin-750 oral tablet: 2 tab(s) orally 3 times a day

## 2021-08-30 NOTE — DISCHARGE NOTE NURSING/CASE MANAGEMENT/SOCIAL WORK - NSDCPEWEB_GEN_ALL_CORE
[facility] for Tobacco Control website --- [email or web address] website --- [email or web address]

## 2021-08-30 NOTE — PHYSICAL THERAPY INITIAL EVALUATION ADULT - PERTINENT HX OF CURRENT PROBLEM, REHAB EVAL
Pt presented to ED with R Lower quadrant pain - MRI revealed "Partial thickness insertional tear of the bilateral rectus femoris tendons at the anterior inferior iliac spine"

## 2021-08-30 NOTE — DISCHARGE NOTE PROVIDER - NSDCCPCAREPLAN_GEN_ALL_CORE_FT
PRINCIPAL DISCHARGE DIAGNOSIS  Diagnosis: Tear of tendon of lower extremity, initial encounter  Assessment and Plan of Treatment: Please go to outpatient physical therapy and schedule an appointment with Dr. Pineda, orthopedist.

## 2021-08-31 PROBLEM — Z78.9 OTHER SPECIFIED HEALTH STATUS: Chronic | Status: ACTIVE | Noted: 2021-08-26

## 2021-09-07 NOTE — CDI QUERY NOTE - NSCDIOTHERTXTBX_GEN_ALL_CORE_HH
Query:  1.	Based on your professional judgement and the clinical indicators, please clarify if the “possible UTI” can be further specified after study as:   a.	UTI ruled in and treated with cefpodoxime.    b.	UTI ruled out  c.	Other diagnosis (please specify): ___________  d.	Unable to determine the status of UTI     Clinical indicators:  U/A (8/26): Trace LE; wbc 6-10; nitrite neg    Rx: Cefpodoxime (8/26 – 8/30)    H&P (8/26): Ponce Quiroga)  (Signed 27-Aug-2021 01:51)  History of Present Illness:   ……… Patient presented to ER twice earlier this week with the same complain, CT abd with contrast was negative, UA showed positive leukocyte esterase and small amount of blood, followed by culture which was negative, rx'ed cefpodixime for possible UTI and Robaxin for possible musculoskeletal pain which she has been taking with no relief……    Progress Note Adult-Hospitalist Attending (8/29): Ariel Bowling)  (Signed 29-Aug-2021 16:48)  •	56 yr old female with no medical history admitted for abdominal pain.   -	Acute Groin pain   ?	r/o right hip joint disease   ?	pain severe enough to limit ambulation   ?	Repeat CT Abdomen and Pelvis w/ Oral Cont and w/ IV Cont (08.26.21): No evidence of acute abdominopelvic pathology. Essentially unchanged imaging since 8/22/2020. Cholelithiasis  ?	pain control with ketorolac  ?	- start pred 40mg PO daily for 4 days

## 2021-09-08 ENCOUNTER — APPOINTMENT (OUTPATIENT)
Dept: INTERNAL MEDICINE | Facility: CLINIC | Age: 56
End: 2021-09-08

## 2021-09-11 DIAGNOSIS — Z71.6 TOBACCO ABUSE COUNSELING: ICD-10-CM
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
## 2021-09-11 DIAGNOSIS — S76.112A STRAIN OF LEFT QUADRICEPS MUSCLE, FASCIA AND TENDON, INITIAL ENCOUNTER: ICD-10-CM
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
## 2021-09-11 DIAGNOSIS — R10.31 RIGHT LOWER QUADRANT PAIN: ICD-10-CM

## 2021-09-11 DIAGNOSIS — M76.01 GLUTEAL TENDINITIS, RIGHT HIP: ICD-10-CM

## 2021-09-11 DIAGNOSIS — M47.816 SPONDYLOSIS WITHOUT MYELOPATHY OR RADICULOPATHY, LUMBAR REGION: ICD-10-CM

## 2021-09-11 DIAGNOSIS — M25.451 EFFUSION, RIGHT HIP: ICD-10-CM

## 2021-09-11 DIAGNOSIS — M16.11 UNILATERAL PRIMARY OSTEOARTHRITIS, RIGHT HIP: ICD-10-CM

## 2021-09-11 DIAGNOSIS — M70.62 TROCHANTERIC BURSITIS, LEFT HIP: ICD-10-CM

## 2021-09-11 DIAGNOSIS — M70.61 TROCHANTERIC BURSITIS, RIGHT HIP: ICD-10-CM

## 2021-09-11 DIAGNOSIS — R31.9 HEMATURIA, UNSPECIFIED: ICD-10-CM

## 2021-09-11 DIAGNOSIS — M76.02 GLUTEAL TENDINITIS, LEFT HIP: ICD-10-CM

## 2021-09-11 DIAGNOSIS — S76.111A STRAIN OF RIGHT QUADRICEPS MUSCLE, FASCIA AND TENDON, INITIAL ENCOUNTER: ICD-10-CM

## 2021-09-11 DIAGNOSIS — Z98.84 BARIATRIC SURGERY STATUS: ICD-10-CM

## 2021-09-11 DIAGNOSIS — K80.20 CALCULUS OF GALLBLADDER WITHOUT CHOLECYSTITIS WITHOUT OBSTRUCTION: ICD-10-CM

## 2021-09-11 DIAGNOSIS — F17.210 NICOTINE DEPENDENCE, CIGARETTES, UNCOMPLICATED: ICD-10-CM
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
## 2021-10-15 ENCOUNTER — RESULT REVIEW (OUTPATIENT)
Age: 56
End: 2021-10-15

## 2021-10-15 ENCOUNTER — OUTPATIENT (OUTPATIENT)
Dept: OUTPATIENT SERVICES | Facility: HOSPITAL | Age: 56
LOS: 1 days | Discharge: HOME | End: 2021-10-15
Payer: COMMERCIAL

## 2021-10-15 DIAGNOSIS — M25.552 PAIN IN LEFT HIP: ICD-10-CM

## 2021-10-15 DIAGNOSIS — M25.551 PAIN IN RIGHT HIP: ICD-10-CM

## 2021-10-15 DIAGNOSIS — Z98.890 OTHER SPECIFIED POSTPROCEDURAL STATES: Chronic | ICD-10-CM

## 2021-10-15 PROCEDURE — 73521 X-RAY EXAM HIPS BI 2 VIEWS: CPT | Mod: 26

## 2021-12-29 ENCOUNTER — TRANSCRIPTION ENCOUNTER (OUTPATIENT)
Age: 56
End: 2021-12-29

## 2022-06-12 ENCOUNTER — NON-APPOINTMENT (OUTPATIENT)
Age: 57
End: 2022-06-12

## 2022-06-15 ENCOUNTER — NON-APPOINTMENT (OUTPATIENT)
Age: 57
End: 2022-06-15

## 2022-06-24 ENCOUNTER — NON-APPOINTMENT (OUTPATIENT)
Age: 57
End: 2022-06-24

## 2022-12-16 ENCOUNTER — NON-APPOINTMENT (OUTPATIENT)
Age: 57
End: 2022-12-16

## 2023-04-02 ENCOUNTER — NON-APPOINTMENT (OUTPATIENT)
Age: 58
End: 2023-04-02

## 2023-08-30 ENCOUNTER — NON-APPOINTMENT (OUTPATIENT)
Age: 58
End: 2023-08-30

## 2024-01-01 NOTE — ED ADULT NURSE NOTE - OBJECTIVE STATEMENT
Goal Outcome Evaluation:  Plan of Care Reviewed With: patient, spouse        Progress: improving  Outcome Evaluation: vitals stable, assessment wdl, voiding and stooling, breast and bottle feeding with sensitive, bonding well with parents                              PT is a 56y Female with c/o of abd pain. Pt states she has lower abdominal pain x 1 week. Denies N/V or fever. Pt states she has had a gastric sleeve.

## 2024-08-23 ENCOUNTER — OUTPATIENT (OUTPATIENT)
Dept: OUTPATIENT SERVICES | Facility: HOSPITAL | Age: 59
LOS: 1 days | End: 2024-08-23
Payer: COMMERCIAL

## 2024-08-23 DIAGNOSIS — Z12.31 ENCOUNTER FOR SCREENING MAMMOGRAM FOR MALIGNANT NEOPLASM OF BREAST: ICD-10-CM

## 2024-08-23 DIAGNOSIS — Z98.890 OTHER SPECIFIED POSTPROCEDURAL STATES: Chronic | ICD-10-CM

## 2024-08-23 DIAGNOSIS — Z00.00 ENCOUNTER FOR GENERAL ADULT MEDICAL EXAMINATION WITHOUT ABNORMAL FINDINGS: ICD-10-CM

## 2024-08-23 PROCEDURE — 77067 SCR MAMMO BI INCL CAD: CPT

## 2024-08-23 PROCEDURE — 77063 BREAST TOMOSYNTHESIS BI: CPT

## 2024-08-23 PROCEDURE — 77067 SCR MAMMO BI INCL CAD: CPT | Mod: 26

## 2024-08-23 PROCEDURE — 77063 BREAST TOMOSYNTHESIS BI: CPT | Mod: 26

## 2024-08-24 DIAGNOSIS — Z12.31 ENCOUNTER FOR SCREENING MAMMOGRAM FOR MALIGNANT NEOPLASM OF BREAST: ICD-10-CM

## 2024-11-06 ENCOUNTER — APPOINTMENT (OUTPATIENT)
Dept: ORTHOPEDIC SURGERY | Facility: CLINIC | Age: 59
End: 2024-11-06
Payer: COMMERCIAL

## 2024-11-06 VITALS — BODY MASS INDEX: 25.16 KG/M2 | WEIGHT: 142 LBS | HEIGHT: 63 IN

## 2024-11-06 DIAGNOSIS — F17.200 NICOTINE DEPENDENCE, UNSPECIFIED, UNCOMPLICATED: ICD-10-CM

## 2024-11-06 DIAGNOSIS — M70.62 TROCHANTERIC BURSITIS, LEFT HIP: ICD-10-CM

## 2024-11-06 DIAGNOSIS — E78.00 PURE HYPERCHOLESTEROLEMIA, UNSPECIFIED: ICD-10-CM

## 2024-11-06 DIAGNOSIS — M76.02 GLUTEAL TENDINITIS, LEFT HIP: ICD-10-CM

## 2024-11-06 PROCEDURE — 99203 OFFICE O/P NEW LOW 30 MIN: CPT

## 2024-11-06 PROCEDURE — 72100 X-RAY EXAM L-S SPINE 2/3 VWS: CPT

## 2024-11-06 PROCEDURE — 73522 X-RAY EXAM HIPS BI 3-4 VIEWS: CPT

## 2024-11-06 RX ORDER — ROSUVASTATIN CALCIUM 5 MG/1
TABLET, FILM COATED ORAL
Refills: 0 | Status: ACTIVE | COMMUNITY

## 2024-11-06 RX ORDER — BUPROPION HYDROCHLORIDE 100 MG/1
TABLET, FILM COATED ORAL
Refills: 0 | Status: ACTIVE | COMMUNITY